# Patient Record
Sex: MALE | Race: WHITE | NOT HISPANIC OR LATINO
[De-identification: names, ages, dates, MRNs, and addresses within clinical notes are randomized per-mention and may not be internally consistent; named-entity substitution may affect disease eponyms.]

---

## 2018-11-06 PROBLEM — Z00.00 ENCOUNTER FOR PREVENTIVE HEALTH EXAMINATION: Status: ACTIVE | Noted: 2018-11-06

## 2018-11-17 ENCOUNTER — RECORD ABSTRACTING (OUTPATIENT)
Age: 83
End: 2018-11-17

## 2018-11-17 DIAGNOSIS — Z87.19 PERSONAL HISTORY OF OTHER DISEASES OF THE DIGESTIVE SYSTEM: ICD-10-CM

## 2018-11-17 DIAGNOSIS — I48.0 PAROXYSMAL ATRIAL FIBRILLATION: ICD-10-CM

## 2018-11-17 DIAGNOSIS — Z87.09 PERSONAL HISTORY OF OTHER DISEASES OF THE RESPIRATORY SYSTEM: ICD-10-CM

## 2018-11-17 DIAGNOSIS — M77.30 CALCANEAL SPUR, UNSPECIFIED FOOT: ICD-10-CM

## 2018-11-17 DIAGNOSIS — Z82.49 FAMILY HISTORY OF ISCHEMIC HEART DISEASE AND OTHER DISEASES OF THE CIRCULATORY SYSTEM: ICD-10-CM

## 2018-11-17 DIAGNOSIS — H35.30 UNSPECIFIED MACULAR DEGENERATION: ICD-10-CM

## 2018-11-17 DIAGNOSIS — Z83.3 FAMILY HISTORY OF DIABETES MELLITUS: ICD-10-CM

## 2018-11-17 DIAGNOSIS — Z83.6 FAMILY HISTORY OF OTHER DISEASES OF THE RESPIRATORY SYSTEM: ICD-10-CM

## 2018-11-17 DIAGNOSIS — Z78.9 OTHER SPECIFIED HEALTH STATUS: ICD-10-CM

## 2018-11-17 DIAGNOSIS — Z86.19 PERSONAL HISTORY OF OTHER INFECTIOUS AND PARASITIC DISEASES: ICD-10-CM

## 2018-11-17 DIAGNOSIS — Z86.69 PERSONAL HISTORY OF OTHER DISEASES OF THE NERVOUS SYSTEM AND SENSE ORGANS: ICD-10-CM

## 2018-11-17 DIAGNOSIS — Z86.39 PERSONAL HISTORY OF OTHER ENDOCRINE, NUTRITIONAL AND METABOLIC DISEASE: ICD-10-CM

## 2018-11-17 DIAGNOSIS — Z81.8 FAMILY HISTORY OF OTHER MENTAL AND BEHAVIORAL DISORDERS: ICD-10-CM

## 2018-11-17 DIAGNOSIS — S12.600A UNSPECIFIED DISPLACED FRACTURE OF SEVENTH CERVICAL VERTEBRA, INITIAL ENCOUNTER FOR CLOSED FRACTURE: ICD-10-CM

## 2018-11-17 DIAGNOSIS — I49.8 OTHER SPECIFIED CARDIAC ARRHYTHMIAS: ICD-10-CM

## 2018-11-17 DIAGNOSIS — Z87.438 PERSONAL HISTORY OF OTHER DISEASES OF MALE GENITAL ORGANS: ICD-10-CM

## 2018-11-17 DIAGNOSIS — D64.9 ANEMIA, UNSPECIFIED: ICD-10-CM

## 2018-11-17 DIAGNOSIS — Z83.1 FAMILY HISTORY OF OTHER INFECTIOUS AND PARASITIC DISEASES: ICD-10-CM

## 2018-11-17 DIAGNOSIS — F51.01 PRIMARY INSOMNIA: ICD-10-CM

## 2018-11-17 RX ORDER — CHOLECALCIFEROL (VITAMIN D3) 50 MCG
50 MCG TABLET ORAL
Refills: 0 | Status: ACTIVE | COMMUNITY

## 2018-11-17 RX ORDER — TIMOLOL MALEATE 5 MG/ML
0.5 SOLUTION OPHTHALMIC
Refills: 0 | Status: ACTIVE | COMMUNITY

## 2018-12-03 ENCOUNTER — RX RENEWAL (OUTPATIENT)
Age: 83
End: 2018-12-03

## 2018-12-05 ENCOUNTER — RX RENEWAL (OUTPATIENT)
Age: 83
End: 2018-12-05

## 2018-12-10 ENCOUNTER — APPOINTMENT (OUTPATIENT)
Dept: NEPHROLOGY | Facility: CLINIC | Age: 83
End: 2018-12-10
Payer: MEDICARE

## 2018-12-10 VITALS
DIASTOLIC BLOOD PRESSURE: 50 MMHG | SYSTOLIC BLOOD PRESSURE: 124 MMHG | HEIGHT: 67 IN | HEART RATE: 76 BPM | BODY MASS INDEX: 29.82 KG/M2 | WEIGHT: 190 LBS

## 2018-12-10 PROCEDURE — 99214 OFFICE O/P EST MOD 30 MIN: CPT

## 2018-12-10 RX ORDER — SITAGLIPTIN 100 MG/1
100 TABLET, FILM COATED ORAL DAILY
Refills: 0 | Status: DISCONTINUED | COMMUNITY
End: 2018-12-02

## 2018-12-10 RX ORDER — FERROUS SULFATE TAB EC 325 MG (65 MG FE EQUIVALENT) 325 (65 FE) MG
325 (65 FE) TABLET DELAYED RESPONSE ORAL DAILY
Refills: 0 | Status: DISCONTINUED | COMMUNITY
End: 2018-12-02

## 2018-12-13 ENCOUNTER — RX RENEWAL (OUTPATIENT)
Age: 83
End: 2018-12-13

## 2018-12-19 ENCOUNTER — RX RENEWAL (OUTPATIENT)
Age: 83
End: 2018-12-19

## 2018-12-19 RX ORDER — FERROUS SULFATE 325(65) MG
325 (65 FE) TABLET ORAL
Refills: 0 | Status: ACTIVE | COMMUNITY

## 2019-01-10 ENCOUNTER — RX RENEWAL (OUTPATIENT)
Age: 84
End: 2019-01-10

## 2019-01-14 ENCOUNTER — RX RENEWAL (OUTPATIENT)
Age: 84
End: 2019-01-14

## 2019-01-28 ENCOUNTER — OTHER (OUTPATIENT)
Age: 84
End: 2019-01-28

## 2019-02-04 ENCOUNTER — RESULT REVIEW (OUTPATIENT)
Age: 84
End: 2019-02-04

## 2019-02-11 ENCOUNTER — APPOINTMENT (OUTPATIENT)
Dept: GERIATRICS | Facility: CLINIC | Age: 84
End: 2019-02-11
Payer: MEDICARE

## 2019-02-11 VITALS
HEART RATE: 75 BPM | DIASTOLIC BLOOD PRESSURE: 60 MMHG | WEIGHT: 190 LBS | BODY MASS INDEX: 29.76 KG/M2 | SYSTOLIC BLOOD PRESSURE: 134 MMHG | TEMPERATURE: 97.8 F | OXYGEN SATURATION: 97 %

## 2019-02-11 DIAGNOSIS — Z71.89 OTHER SPECIFIED COUNSELING: ICD-10-CM

## 2019-02-11 PROCEDURE — 99497 ADVNCD CARE PLAN 30 MIN: CPT

## 2019-02-11 NOTE — REVIEW OF SYSTEMS
[Fever] : no fever [Chills] : no chills [Red Eyes] : eyes not red [Discharge From Eyes] : no purulent discharge from the eyes [Nosebleeds] : no nosebleeds [Nasal Discharge] : no nasal discharge [Chest Pain] : no chest pain [Palpitations] : no palpitations [Shortness Of Breath] : no shortness of breath [Wheezing] : no wheezing [Cough] : no cough [Abdominal Pain] : no abdominal pain [Vomiting] : no vomiting [Genital Lesion] : no genital lesions [Testicular Pain] : no testicular pain [Limb Pain] : no limb pain [Limb Swelling] : no limb swelling [Skin Lesions] : no skin lesions [Skin Wound] : no skin wound [Dizziness] : no dizziness [Fainting] : no fainting [Anxiety] : no anxiety [Depression] : no depression

## 2019-02-11 NOTE — ASSESSMENT
[FreeTextEntry1] : filled out MOLST form today\par to be scanned into chart\par patient is full code, but if irreversible changes - poor quality of life would not want to have life prolonging measures\par to bring in copy of HCP and living will as well\par reviewed labs today\par A1C slightly uptrending\par Cr controlled and lipids stable\par RTC july

## 2019-02-11 NOTE — HISTORY OF PRESENT ILLNESS
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

## 2019-02-11 NOTE — SOCIAL HISTORY
[Any fall with injury in past year] : Patient reported fall with injury in the past year [Fully functional (bathing, dressing, toileting, transferring, walking, feeding)] : Fully functional (bathing, dressing, toileting, transferring, walking, feeding) [Fully functional (using the telephone, shopping, preparing meals, housekeeping, doing laundry, using transportation,] : Fully functional and needs no help or supervision to perform IADLs (using the telephone, shopping, preparing meals, housekeeping, doing laundry, using transportation, managing medications and managing finances) [Canes] : patricia [Smoke Detector] : smoke detector [Carbon Monoxide Detector] : carbon monoxide detector [Safety elements used in home] : safety elements used in home [Grab Bars] : grab bars [Shower Chair] : shower chair [Night Light] : night light [Anti-Slip Measures] : anti-slip measures [Seat Belt] :  uses seat belt [Driving] : driving

## 2019-02-11 NOTE — PHYSICAL EXAM
[General Appearance - Alert] : alert [General Appearance - In No Acute Distress] : in no acute distress [General Appearance - Well Nourished] : well nourished [General Appearance - Well Developed] : well developed [Sclera] : the sclera and conjunctiva were normal [Extraocular Movements] : extraocular movements were intact [Normal Oral Mucosa] : normal oral mucosa [No Oral Pallor] : no oral pallor [Neck Appearance] : the appearance of the neck was normal [Respiration, Rhythm And Depth] : normal respiratory rhythm and effort [Exaggerated Use Of Accessory Muscles For Inspiration] : no accessory muscle use [Bowel Sounds] : normal bowel sounds [Abdomen Soft] : soft [Abdomen Tenderness] : non-tender [No CVA Tenderness] : no ~M costovertebral angle tenderness [Abnormal Walk] : normal gait [Skin Color & Pigmentation] : normal skin color and pigmentation [] : no rash [Oriented To Time, Place, And Person] : oriented to person, place, and time [Impaired Insight] : insight and judgment were intact [Affect] : the affect was normal [Mood] : the mood was normal

## 2019-03-03 ENCOUNTER — RESULT CHARGE (OUTPATIENT)
Age: 84
End: 2019-03-03

## 2019-03-04 ENCOUNTER — APPOINTMENT (OUTPATIENT)
Dept: ENDOCRINOLOGY | Facility: CLINIC | Age: 84
End: 2019-03-04
Payer: MEDICARE

## 2019-03-04 VITALS
BODY MASS INDEX: 30.29 KG/M2 | WEIGHT: 193 LBS | HEIGHT: 67 IN | SYSTOLIC BLOOD PRESSURE: 155 MMHG | DIASTOLIC BLOOD PRESSURE: 60 MMHG

## 2019-03-04 PROCEDURE — 82962 GLUCOSE BLOOD TEST: CPT

## 2019-03-04 PROCEDURE — 99214 OFFICE O/P EST MOD 30 MIN: CPT

## 2019-03-04 NOTE — HISTORY OF PRESENT ILLNESS
[FreeTextEntry1] :   85 yo WM coming for f/u DM2, last seen me more than 4yrs ago, pt of Dr Gee\par        Follow up for TDM2 with micro and macrovascular complications including CABG 2013, HTN, HLD, Nephropathy and neuropathy. \par        Dx 10 years ago. \par        Hga1c up to 7.3, now firher up to 7.6 despite increasing Truclitiy last visit \par        Regimen: \par        Metformin ER dec to 500mg PO Qam and 500mg QPM\par        off Januvia 100mg PO at Lunch stopped 8/2018\par        Glipizide ER 10mg ER PO BID\par        Trulicity 1.5mg SC weekly denies side effects \par        Tolerating medications well with no SE. \par        Fell at home Fell on to coffee table. Fractured cervical spine. 3 months ago, lost weight then gained back\par        Checks BG levels twice daily from BS log :\par        Sporadically throughout the day\par        No lower than 70\par        has 160-230 after meals \par        Microvascular complications: \par        Nephropathy denies Cr 1.4 eGFR 42. Sees Dr. Cochran. decreased Clorthalidone twice a week\par        Neuropathy symptoms, Due for another appointment. \par        microfilament exam + neuropathy.\par        Retinopathy sees Dr. Fierro. Had Elyea Left eye Injection. Visit Q6-8 weeks. \par        No recent loss of vision or blurry vision. \par        Macrovascular complications: \par        HTN improved. Sees Dr. Chopra. Toprolol ER 25mg PO BID, Clorthalidone 25mg PO Twice weekly. Echo and stress next week. Diovan dcd and now on Losartan 100mg PO Qdaily\par        CAD/CVA yes CABG 2013/Afib. Off Elaquis since the fall on 81mg PO Qdaily\par        Lipids , LDL 58, HDL 28. Lovaza 2g bid, Lipitor 40mg qpm\par        Vitamin B12 liquid once weekly \par        Vitamin D3 1000iu tab Twice daily\par        Iron 325mg PO Qdaily\par        TSH 1.97.

## 2019-04-10 ENCOUNTER — APPOINTMENT (OUTPATIENT)
Dept: NEPHROLOGY | Facility: CLINIC | Age: 84
End: 2019-04-10
Payer: MEDICARE

## 2019-04-10 VITALS
SYSTOLIC BLOOD PRESSURE: 150 MMHG | HEART RATE: 64 BPM | WEIGHT: 185 LBS | BODY MASS INDEX: 29.03 KG/M2 | HEIGHT: 67 IN | DIASTOLIC BLOOD PRESSURE: 62 MMHG

## 2019-04-10 PROCEDURE — 99214 OFFICE O/P EST MOD 30 MIN: CPT

## 2019-04-10 NOTE — ASSESSMENT
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

## 2019-04-10 NOTE — PHYSICAL EXAM
[General Appearance - Alert] : alert [Outer Ear] : the ears and nose were normal in appearance [Sclera] : the sclera and conjunctiva were normal [Neck Cervical Mass (___cm)] : no neck mass was observed [Neck Appearance] : the appearance of the neck was normal [] : no respiratory distress [Apical Impulse] : the apical impulse was normal [Exaggerated Use Of Accessory Muscles For Inspiration] : no accessory muscle use [Heart Sounds] : normal S1 and S2 [Bowel Sounds] : normal bowel sounds [Abdomen Tenderness] : non-tender [Nail Clubbing] : no clubbing  or cyanosis of the fingernails [Abnormal Walk] : normal gait [Oriented To Time, Place, And Person] : oriented to person, place, and time [Cranial Nerves] : cranial nerves 2-12 were intact [Sensation] : the sensory exam was normal to light touch and pinprick [Impaired Insight] : insight and judgment were intact

## 2019-04-30 ENCOUNTER — APPOINTMENT (OUTPATIENT)
Dept: CARDIOLOGY | Facility: CLINIC | Age: 84
End: 2019-04-30
Payer: MEDICARE

## 2019-04-30 ENCOUNTER — NON-APPOINTMENT (OUTPATIENT)
Age: 84
End: 2019-04-30

## 2019-04-30 VITALS
HEIGHT: 67 IN | BODY MASS INDEX: 29.35 KG/M2 | SYSTOLIC BLOOD PRESSURE: 140 MMHG | DIASTOLIC BLOOD PRESSURE: 60 MMHG | WEIGHT: 187 LBS | HEART RATE: 72 BPM

## 2019-04-30 VITALS
WEIGHT: 187 LBS | DIASTOLIC BLOOD PRESSURE: 60 MMHG | BODY MASS INDEX: 29.35 KG/M2 | HEIGHT: 67 IN | HEART RATE: 72 BPM | SYSTOLIC BLOOD PRESSURE: 140 MMHG

## 2019-04-30 PROCEDURE — 93000 ELECTROCARDIOGRAM COMPLETE: CPT

## 2019-04-30 PROCEDURE — 99213 OFFICE O/P EST LOW 20 MIN: CPT

## 2019-04-30 NOTE — DISCUSSION/SUMMARY
[FreeTextEntry1] : The patient is doing very well clinically. He has rare episodes of angina which are currently under unusual circumstances in general he remains very active with no cardiac symptoms. His examination is satisfactory his electrocardiogram reveals sinus rhythm left axis deviation right bundle branch block with no acute changes noted. Based on today's visit I plan to continue the patient's current medications we will do treadmill testing in several months.

## 2019-04-30 NOTE — REASON FOR VISIT
[FreeTextEntry1] : He patient is followed with a principal diagnosis of coronary bypass surgery and diabetes mellitus.

## 2019-04-30 NOTE — PHYSICAL EXAM
[General Appearance - Well Developed] : well developed [Normal Appearance] : normal appearance [Well Groomed] : well groomed [General Appearance - Well Nourished] : well nourished [No Deformities] : no deformities [General Appearance - In No Acute Distress] : no acute distress [Normal Conjunctiva] : the conjunctiva exhibited no abnormalities [Eyelids - No Xanthelasma] : the eyelids demonstrated no xanthelasmas [Normal Oral Mucosa] : normal oral mucosa [No Oral Pallor] : no oral pallor [No Oral Cyanosis] : no oral cyanosis [Normal Jugular Venous A Waves Present] : normal jugular venous A waves present [Normal Jugular Venous V Waves Present] : normal jugular venous V waves present [No Jugular Venous Ash A Waves] : no jugular venous ash A waves [Respiration, Rhythm And Depth] : normal respiratory rhythm and effort [Exaggerated Use Of Accessory Muscles For Inspiration] : no accessory muscle use [Auscultation Breath Sounds / Voice Sounds] : lungs were clear to auscultation bilaterally [Heart Rate And Rhythm] : heart rate and rhythm were normal [Heart Sounds] : normal S1 and S2 [Murmurs] : no murmurs present [FreeTextEntry1] : median sternotomy scar. [Abdomen Soft] : soft [Abdomen Tenderness] : non-tender [Abdomen Mass (___ Cm)] : no abdominal mass palpated [Abnormal Walk] : normal gait [Gait - Sufficient For Exercise Testing] : the gait was sufficient for exercise testing [Nail Clubbing] : no clubbing of the fingernails [Cyanosis, Localized] : no localized cyanosis [Petechial Hemorrhages (___cm)] : no petechial hemorrhages [Skin Color & Pigmentation] : normal skin color and pigmentation [] : no rash [No Venous Stasis] : no venous stasis [Skin Lesions] : no skin lesions [No Skin Ulcers] : no skin ulcer [No Xanthoma] : no  xanthoma was observed [Oriented To Time, Place, And Person] : oriented to person, place, and time [Affect] : the affect was normal [Mood] : the mood was normal [No Anxiety] : not feeling anxious

## 2019-04-30 NOTE — HISTORY OF PRESENT ILLNESS
[FreeTextEntry1] : The patient is doing very well clinically. He remains quite active with regular exercises. He does note occasional episodes of angina especially when in [place]. This occurs when he is rushing and a very crowded situation. Symptoms resolved very promptly without any intervention. He has not required nitroglycerin. Symptom has not in creased in frequency or severity to

## 2019-05-21 ENCOUNTER — APPOINTMENT (OUTPATIENT)
Dept: GERIATRICS | Facility: CLINIC | Age: 84
End: 2019-05-21
Payer: MEDICARE

## 2019-05-21 VITALS
SYSTOLIC BLOOD PRESSURE: 140 MMHG | HEART RATE: 62 BPM | OXYGEN SATURATION: 97 % | RESPIRATION RATE: 18 BRPM | DIASTOLIC BLOOD PRESSURE: 62 MMHG | TEMPERATURE: 97.7 F

## 2019-05-21 DIAGNOSIS — J06.9 ACUTE UPPER RESPIRATORY INFECTION, UNSPECIFIED: ICD-10-CM

## 2019-05-21 PROCEDURE — 99213 OFFICE O/P EST LOW 20 MIN: CPT

## 2019-05-21 NOTE — HISTORY OF PRESENT ILLNESS
[FreeTextEntry1] : cough and cold for about one week, no phlegm production\par did have laryngitis as well which is now resolving\par Took claritin with no effect and has been \par taking ricola cough drops\par This morning felt a "tightness" in his lungs\par No chest pain, no palpitations no SOB\par No chills no fever no body aching\par Appetite has been nl

## 2019-05-21 NOTE — REVIEW OF SYSTEMS
[Fever] : no fever [Chills] : no chills [Feeling Tired] : not feeling tired [Shortness Of Breath] : no shortness of breath [Wheezing] : no wheezing [Cough] : cough [SOB on Exertion] : no shortness of breath during exertion [Negative] : Neurological

## 2019-05-21 NOTE — ASSESSMENT
[FreeTextEntry1] : continue supportive care including rest, increased fluids, warm teas and honey. May take tylenol\par Inform NP or MD if any increased cough, chest pain, fever, SOB or other concerns or does not\par get better within 3-5 days. He verbalized understanding

## 2019-05-21 NOTE — PHYSICAL EXAM
[General Appearance - Alert] : alert [General Appearance - In No Acute Distress] : in no acute distress [General Appearance - Well Nourished] : well nourished [General Appearance - Well Developed] : well developed [Both Tympanic Membranes Were Examined] : both tympanic membranes were normal [Nasal Cavity] : the nasal mucosa and septum were normal [Oropharynx] : the oropharynx was normal [FreeTextEntry1] : no tenderness over facial sinuses [Neck Appearance] : the appearance of the neck was normal [] : no respiratory distress [Respiration, Rhythm And Depth] : normal respiratory rhythm and effort [Exaggerated Use Of Accessory Muscles For Inspiration] : no accessory muscle use [Auscultation Breath Sounds / Voice Sounds] : lungs were clear to auscultation bilaterally [Apical Impulse] : the apical impulse was normal [Heart Rate And Rhythm] : heart rate was normal and rhythm regular [Heart Sounds] : normal S1 and S2

## 2019-05-28 ENCOUNTER — RX RENEWAL (OUTPATIENT)
Age: 84
End: 2019-05-28

## 2019-06-10 ENCOUNTER — APPOINTMENT (OUTPATIENT)
Dept: ENDOCRINOLOGY | Facility: CLINIC | Age: 84
End: 2019-06-10
Payer: MEDICARE

## 2019-06-10 ENCOUNTER — APPOINTMENT (OUTPATIENT)
Dept: ENDOCRINOLOGY | Facility: CLINIC | Age: 84
End: 2019-06-10

## 2019-06-10 ENCOUNTER — APPOINTMENT (OUTPATIENT)
Dept: GERIATRICS | Facility: CLINIC | Age: 84
End: 2019-06-10
Payer: MEDICARE

## 2019-06-10 VITALS — HEART RATE: 76 BPM | OXYGEN SATURATION: 97 % | SYSTOLIC BLOOD PRESSURE: 128 MMHG | DIASTOLIC BLOOD PRESSURE: 54 MMHG

## 2019-06-10 VITALS
DIASTOLIC BLOOD PRESSURE: 62 MMHG | HEIGHT: 67 IN | BODY MASS INDEX: 29.19 KG/M2 | HEART RATE: 65 BPM | WEIGHT: 186 LBS | SYSTOLIC BLOOD PRESSURE: 110 MMHG

## 2019-06-10 VITALS
HEIGHT: 67 IN | SYSTOLIC BLOOD PRESSURE: 124 MMHG | BODY MASS INDEX: 29.51 KG/M2 | HEART RATE: 62 BPM | DIASTOLIC BLOOD PRESSURE: 72 MMHG | WEIGHT: 188 LBS

## 2019-06-10 LAB
GLUCOSE BLDC GLUCOMTR-MCNC: 170
GLUCOSE BLDC GLUCOMTR-MCNC: 197

## 2019-06-10 PROCEDURE — 99214 OFFICE O/P EST MOD 30 MIN: CPT

## 2019-06-10 PROCEDURE — 99213 OFFICE O/P EST LOW 20 MIN: CPT

## 2019-06-10 NOTE — HISTORY OF PRESENT ILLNESS
[FreeTextEntry1] : Was hospitalized for dizzness and started on \par meclizine prn\par Did have very elevated BP in hospital as per\par pt ~220/120. \par Now here for f/u BP

## 2019-06-10 NOTE — ASSESSMENT
[FreeTextEntry1] : Pt will f/u with [facility] nurse mgr to take BP biw x next 2 wks and to inform MD or NP if any dizziness not controlled with prn meclizine, not feeling well, headaches or other concerns. He verbalized understanding

## 2019-06-10 NOTE — HISTORY OF PRESENT ILLNESS
[FreeTextEntry1] :   87 yo WM coming for f/u DM2, seen Medina Willson \par        Follow up for TDM2 with micro and macrovascular complications including CABG 2013, HTN, HLD, Nephropathy and neuropathy. \par        Dx 10 years ago. \par        Hga1c up to 7.3, now firher up to 7.6 despite increasing Truclitiy last visit \par        Regimen: \par        Metformin ER dec to 500mg PO Qam and 500mg QPM\par        off Januvia 100mg PO at Lunch stopped 8/2018\par        Glipizide ER 10mg ER PO BID\par        Trulicity 1.5mg SC weekly denies side effects \par        Tolerating medications well with no SE. \par        Fell at home Fell on to coffee table. Fractured cervical spine. 3 months ago, lost weight then gained back\par        Checks BG levels twice daily from BS log :\par        Sporadically throughout the day\par      fasting 101-174\par        has    after meals  once 224\par        Microvascular complications: \par        Nephropathy denies Cr 1.4 eGFR 42. Sees Dr. Cochran. decreased Clorthalidone twice a week\par        Neuropathy symptoms, Due for another appointment. \par        microfilament exam + neuropathy.\par        Retinopathy sees Dr. Fierro. Had Elyea Left eye Injection. Visit Q6-8 weeks. \par        No recent loss of vision or blurry vision. \par        Macrovascular complications: \par        HTN improved. Sees Dr. Chopra. Toprolol ER 25mg PO BID, Clorthalidone 25mg PO Twice weekly. Echo and stress next week. Diovan dcd and now on Losartan 100mg PO Qdaily\par        CAD/CVA yes CABG 2013/Afib. Off Elaquis since the fall on 81mg PO Qdaily\par        Lipids , LDL 58, HDL 28. Lovaza 2g bid, Lipitor 40mg qpm\par        Vitamin B12 liquid once weekly \par        Vitamin D3 1000iu tab Twice daily\par        Iron 325mg PO Qdaily\par        TSH 1.97.

## 2019-06-10 NOTE — PHYSICAL EXAM
[Alert] : alert [Well Nourished] : well nourished [No Acute Distress] : no acute distress [Well Developed] : well developed [Normal Sclera/Conjunctiva] : normal sclera/conjunctiva [EOMI] : extra ocular movement intact [Thyroid Not Enlarged] : the thyroid was not enlarged [Normal Oropharynx] : the oropharynx was normal [No Proptosis] : no proptosis [No Respiratory Distress] : no respiratory distress [No Thyroid Nodules] : there were no palpable thyroid nodules [Clear to Auscultation] : lungs were clear to auscultation bilaterally [No Accessory Muscle Use] : no accessory muscle use [Normal S1, S2] : normal S1 and S2 [Normal Rate] : heart rate was normal  [Regular Rhythm] : with a regular rhythm [Pedal Pulses Normal] : the pedal pulses are present [Not Tender] : non-tender [Normal Bowel Sounds] : normal bowel sounds [No Edema] : there was no peripheral edema [Soft] : abdomen soft [Anterior Cervical Nodes] : anterior cervical nodes [Post Cervical Nodes] : posterior cervical nodes [Not Distended] : not distended [Axillary Nodes] : axillary nodes [Normal] : normal and non tender [Spine Straight] : spine straight [No Spinal Tenderness] : no spinal tenderness [Normal Gait] : normal gait [No Stigmata of Cushings Syndrome] : no stigmata of cushings syndrome [No Rash] : no rash [Normal Strength/Tone] : muscle strength and tone were normal [Acanthosis Nigricans] : no acanthosis nigricans [Normal Reflexes] : deep tendon reflexes were 2+ and symmetric [No Tremors] : no tremors [Oriented x3] : oriented to person, place, and time

## 2019-06-10 NOTE — PHYSICAL EXAM
[General Appearance - Alert] : alert [General Appearance - In No Acute Distress] : in no acute distress [General Appearance - Well Developed] : well developed [General Appearance - Well Nourished] : well nourished [] : no respiratory distress [Sclera] : the sclera and conjunctiva were normal [Apical Impulse] : the apical impulse was normal [Respiration, Rhythm And Depth] : normal respiratory rhythm and effort [Auscultation Breath Sounds / Voice Sounds] : lungs were clear to auscultation bilaterally [Heart Sounds] : normal S1 and S2 [Heart Rate And Rhythm] : heart rate was normal and rhythm regular

## 2019-07-01 ENCOUNTER — RX RENEWAL (OUTPATIENT)
Age: 84
End: 2019-07-01

## 2019-07-05 ENCOUNTER — MEDICATION RENEWAL (OUTPATIENT)
Age: 84
End: 2019-07-05

## 2019-07-09 ENCOUNTER — APPOINTMENT (OUTPATIENT)
Dept: CARDIOLOGY | Facility: CLINIC | Age: 84
End: 2019-07-09
Payer: MEDICARE

## 2019-07-09 ENCOUNTER — NON-APPOINTMENT (OUTPATIENT)
Age: 84
End: 2019-07-09

## 2019-07-09 VITALS
HEART RATE: 67 BPM | SYSTOLIC BLOOD PRESSURE: 138 MMHG | DIASTOLIC BLOOD PRESSURE: 68 MMHG | HEIGHT: 67 IN | BODY MASS INDEX: 28.88 KG/M2 | WEIGHT: 184 LBS

## 2019-07-09 PROCEDURE — 99214 OFFICE O/P EST MOD 30 MIN: CPT

## 2019-07-09 PROCEDURE — 93000 ELECTROCARDIOGRAM COMPLETE: CPT

## 2019-07-09 RX ORDER — ATORVASTATIN CALCIUM 40 MG/1
40 TABLET, FILM COATED ORAL DAILY
Refills: 0 | Status: DISCONTINUED | COMMUNITY
End: 2019-07-09

## 2019-07-09 NOTE — PHYSICAL EXAM
[General Appearance - Well Developed] : well developed [Normal Appearance] : normal appearance [Well Groomed] : well groomed [No Deformities] : no deformities [General Appearance - Well Nourished] : well nourished [General Appearance - In No Acute Distress] : no acute distress [Normal Conjunctiva] : the conjunctiva exhibited no abnormalities [Eyelids - No Xanthelasma] : the eyelids demonstrated no xanthelasmas [Normal Oral Mucosa] : normal oral mucosa [No Oral Pallor] : no oral pallor [Normal Jugular Venous A Waves Present] : normal jugular venous A waves present [No Oral Cyanosis] : no oral cyanosis [Normal Jugular Venous V Waves Present] : normal jugular venous V waves present [No Jugular Venous Ash A Waves] : no jugular venous ash A waves [Respiration, Rhythm And Depth] : normal respiratory rhythm and effort [Exaggerated Use Of Accessory Muscles For Inspiration] : no accessory muscle use [Auscultation Breath Sounds / Voice Sounds] : lungs were clear to auscultation bilaterally [Heart Rate And Rhythm] : heart rate and rhythm were normal [Heart Sounds] : normal S1 and S2 [Murmurs] : no murmurs present [Abdomen Tenderness] : non-tender [Abdomen Soft] : soft [Abdomen Mass (___ Cm)] : no abdominal mass palpated [Nail Clubbing] : no clubbing of the fingernails [Abnormal Walk] : normal gait [Gait - Sufficient For Exercise Testing] : the gait was sufficient for exercise testing [Petechial Hemorrhages (___cm)] : no petechial hemorrhages [Cyanosis, Localized] : no localized cyanosis [Skin Color & Pigmentation] : normal skin color and pigmentation [] : no rash [No Venous Stasis] : no venous stasis [Skin Lesions] : no skin lesions [No Xanthoma] : no  xanthoma was observed [No Skin Ulcers] : no skin ulcer [Oriented To Time, Place, And Person] : oriented to person, place, and time [Affect] : the affect was normal [Mood] : the mood was normal [No Anxiety] : not feeling anxious

## 2019-07-09 NOTE — REASON FOR VISIT
[FreeTextEntry1] : The patient was recently admitted to [facility] on June 4 with symptoms of vertigo. The patient was found to have an elevated blood pressure in the ER exceeding 200 mm of mercury systolic. There was concern about a neurologic process and he was admitted to the hospital for three  nights. Room this time both the vertigo and hypertension came under very good control. There was no evidence of an acute neurologic event. I suspect that the spike and blood pressure was related to the vertigo itself causing a great deal of anxiety. Following discharge the patient has done well. He has returned to his normal activities and symptoms have completely resolved.

## 2019-07-09 NOTE — DISCUSSION/SUMMARY
[FreeTextEntry1] : The patient's clinical condition is now stable and cleared back to normal". 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 Based on my evaluation am pleased with the patient's current condition. Present medications will be continued.

## 2019-07-18 ENCOUNTER — OTHER (OUTPATIENT)
Age: 84
End: 2019-07-18

## 2019-07-29 ENCOUNTER — APPOINTMENT (OUTPATIENT)
Dept: GERIATRICS | Facility: CLINIC | Age: 84
End: 2019-07-29
Payer: MEDICARE

## 2019-07-29 VITALS
OXYGEN SATURATION: 99 % | DIASTOLIC BLOOD PRESSURE: 67 MMHG | WEIGHT: 185 LBS | HEART RATE: 71 BPM | SYSTOLIC BLOOD PRESSURE: 150 MMHG | BODY MASS INDEX: 28.98 KG/M2 | TEMPERATURE: 98.5 F

## 2019-07-29 VITALS — DIASTOLIC BLOOD PRESSURE: 70 MMHG | SYSTOLIC BLOOD PRESSURE: 128 MMHG

## 2019-07-29 PROCEDURE — 99214 OFFICE O/P EST MOD 30 MIN: CPT

## 2019-07-29 RX ORDER — GUAIFENESIN 600 MG/1
600 TABLET, EXTENDED RELEASE ORAL
Qty: 10 | Refills: 0 | Status: DISCONTINUED | OUTPATIENT
Start: 2019-05-21 | End: 2019-07-29

## 2019-07-29 NOTE — ASSESSMENT
[FreeTextEntry1] : s/p first episode of vertigo\par now has PRN meclizine at home\par gave Rx for hearing evaluation (annual)\par A1C due in 2 months\par refilled lipitor\par otherwise stable\par repeat BP better controlled

## 2019-07-29 NOTE — REVIEW OF SYSTEMS
[Fever] : no fever [Chills] : no chills [Red Eyes] : eyes not red [Discharge From Eyes] : no purulent discharge from the eyes [Nosebleeds] : no nosebleeds [Nasal Discharge] : no nasal discharge [Chest Pain] : no chest pain [Palpitations] : no palpitations [Wheezing] : no wheezing [Shortness Of Breath] : no shortness of breath [Cough] : no cough [Abdominal Pain] : no abdominal pain [Vomiting] : no vomiting [Genital Lesion] : no genital lesions [Testicular Pain] : no testicular pain [Limb Pain] : no limb pain [Limb Swelling] : no limb swelling [Skin Lesions] : no skin lesions [Skin Wound] : no skin wound [Dizziness] : no dizziness [Fainting] : no fainting [Anxiety] : no anxiety [Depression] : no depression

## 2019-07-29 NOTE — PHYSICAL EXAM
[General Appearance - Alert] : alert [General Appearance - In No Acute Distress] : in no acute distress [General Appearance - Well Nourished] : well nourished [General Appearance - Well Developed] : well developed [Sclera] : the sclera and conjunctiva were normal [Extraocular Movements] : extraocular movements were intact [No Oral Pallor] : no oral pallor [Normal Oral Mucosa] : normal oral mucosa [Neck Appearance] : the appearance of the neck was normal [Respiration, Rhythm And Depth] : normal respiratory rhythm and effort [Bowel Sounds] : normal bowel sounds [Exaggerated Use Of Accessory Muscles For Inspiration] : no accessory muscle use [Abdomen Soft] : soft [Abdomen Tenderness] : non-tender [Abnormal Walk] : normal gait [No CVA Tenderness] : no ~M costovertebral angle tenderness [Skin Color & Pigmentation] : normal skin color and pigmentation [Impaired Insight] : insight and judgment were intact [] : no rash [Oriented To Time, Place, And Person] : oriented to person, place, and time [Affect] : the affect was normal [Mood] : the mood was normal

## 2019-07-29 NOTE — SOCIAL HISTORY
[No falls in past year] : Patient reported no falls in the past year [Fully functional (bathing, dressing, toileting, transferring, walking, feeding)] : Fully functional (bathing, dressing, toileting, transferring, walking, feeding) [Fully functional (using the telephone, shopping, preparing meals, housekeeping, doing laundry, using transportation,] : Fully functional and needs no help or supervision to perform IADLs (using the telephone, shopping, preparing meals, housekeeping, doing laundry, using transportation, managing medications and managing finances) [Canes] : patricia [Smoke Detector] : smoke detector [Carbon Monoxide Detector] : carbon monoxide detector [Safety elements used in home] : safety elements used in home [Grab Bars] : grab bars [Shower Chair] : shower chair [Night Light] : night light [Anti-Slip Measures] : anti-slip measures [Seat Belt] :  uses seat belt [Driving] : driving [de-identified] : AS NEEDED

## 2019-07-29 NOTE — HISTORY OF PRESENT ILLNESS
[0] : 1) Little interest or pleasure doing things: Not at all [PHQ-2 Score ___] : PHQ-2 Score [unfilled] [FreeTextEntry1] : Admitted to [facility] June 4 with symptoms of vertigo and elevated SBP >200.  \par \par At this time both the vertigo and hypertension have resolved. \par This was his first episode of vertigo in his lifetime.  He believes he might have had URI in May.  Onset had been gradual as the morning of hospital admission he was driving.  Symptoms had rapid onset.  \par \par Otherwise feeling well.  Metformin and Glipizide were recently increased as well as trulicity.  His AM sugars fasting are typically below 100.  He is trying to adhere to a more strict diet.

## 2019-08-06 ENCOUNTER — RX RENEWAL (OUTPATIENT)
Age: 84
End: 2019-08-06

## 2019-08-09 ENCOUNTER — MEDICATION RENEWAL (OUTPATIENT)
Age: 84
End: 2019-08-09

## 2019-08-12 ENCOUNTER — MEDICATION RENEWAL (OUTPATIENT)
Age: 84
End: 2019-08-12

## 2019-08-23 ENCOUNTER — RX RENEWAL (OUTPATIENT)
Age: 84
End: 2019-08-23

## 2019-09-10 ENCOUNTER — APPOINTMENT (OUTPATIENT)
Dept: ENDOCRINOLOGY | Facility: CLINIC | Age: 84
End: 2019-09-10
Payer: MEDICARE

## 2019-09-10 VITALS
WEIGHT: 187 LBS | BODY MASS INDEX: 29.35 KG/M2 | RESPIRATION RATE: 16 BRPM | HEART RATE: 70 BPM | DIASTOLIC BLOOD PRESSURE: 70 MMHG | HEIGHT: 67 IN | SYSTOLIC BLOOD PRESSURE: 130 MMHG | OXYGEN SATURATION: 99 %

## 2019-09-10 LAB — GLUCOSE BLDC GLUCOMTR-MCNC: 77

## 2019-09-10 PROCEDURE — 82962 GLUCOSE BLOOD TEST: CPT

## 2019-09-10 PROCEDURE — G0447 BEHAVIOR COUNSEL OBESITY 15M: CPT | Mod: 59

## 2019-09-10 PROCEDURE — 99214 OFFICE O/P EST MOD 30 MIN: CPT | Mod: 25

## 2019-09-10 NOTE — PHYSICAL EXAM
[Alert] : alert [No Acute Distress] : no acute distress [Well Nourished] : well nourished [Well Developed] : well developed [Normal Sclera/Conjunctiva] : normal sclera/conjunctiva [EOMI] : extra ocular movement intact [No Proptosis] : no proptosis [Normal Oropharynx] : the oropharynx was normal [Thyroid Not Enlarged] : the thyroid was not enlarged [No Thyroid Nodules] : there were no palpable thyroid nodules [No Respiratory Distress] : no respiratory distress [No Accessory Muscle Use] : no accessory muscle use [Clear to Auscultation] : lungs were clear to auscultation bilaterally [Normal Rate] : heart rate was normal  [Normal S1, S2] : normal S1 and S2 [Regular Rhythm] : with a regular rhythm [Pedal Pulses Normal] : the pedal pulses are present [No Edema] : there was no peripheral edema [Normal Bowel Sounds] : normal bowel sounds [Not Tender] : non-tender [Soft] : abdomen soft [Not Distended] : not distended [Post Cervical Nodes] : posterior cervical nodes [Anterior Cervical Nodes] : anterior cervical nodes [Axillary Nodes] : axillary nodes [No Spinal Tenderness] : no spinal tenderness [Spine Straight] : spine straight [No Stigmata of Cushings Syndrome] : no stigmata of cushings syndrome [Normal Gait] : normal gait [Normal Strength/Tone] : muscle strength and tone were normal [No Rash] : no rash [Acanthosis Nigricans] : no acanthosis nigricans [Right Foot Was Examined] : right foot ~C was examined [Left Foot Was Examined] : left foot ~C was examined [Normal] : normal [Full ROM] : with full range of motion [2+] : 2+ in the dorsalis pedis [Vibration Dec.] : normal vibratory sensation at the level of the toes [Position Sense Dec.] : normal position sense at the level of the toes [Diminished Throughout Both Feet] : normal tactile sensation with monofilament testing throughout both feet [Normal Reflexes] : deep tendon reflexes were 2+ and symmetric [No Tremors] : no tremors [Normal Sensation on Monofilament Testing] : normal sensation on monofilament testing of lower extremities [Oriented x3] : oriented to person, place, and time

## 2019-10-08 ENCOUNTER — RX RENEWAL (OUTPATIENT)
Age: 84
End: 2019-10-08

## 2019-10-11 ENCOUNTER — APPOINTMENT (OUTPATIENT)
Dept: GERIATRICS | Facility: CLINIC | Age: 84
End: 2019-10-11
Payer: MEDICARE

## 2019-10-11 PROCEDURE — 90686 IIV4 VACC NO PRSV 0.5 ML IM: CPT

## 2019-10-11 PROCEDURE — G0008: CPT

## 2019-10-14 ENCOUNTER — APPOINTMENT (OUTPATIENT)
Dept: NEPHROLOGY | Facility: CLINIC | Age: 84
End: 2019-10-14

## 2019-10-29 ENCOUNTER — MEDICATION RENEWAL (OUTPATIENT)
Age: 84
End: 2019-10-29

## 2019-10-29 ENCOUNTER — RX RENEWAL (OUTPATIENT)
Age: 84
End: 2019-10-29

## 2019-11-05 ENCOUNTER — APPOINTMENT (OUTPATIENT)
Dept: CARDIOLOGY | Facility: CLINIC | Age: 84
End: 2019-11-05
Payer: MEDICARE

## 2019-11-05 VITALS
HEART RATE: 69 BPM | BODY MASS INDEX: 28.72 KG/M2 | SYSTOLIC BLOOD PRESSURE: 130 MMHG | HEIGHT: 67 IN | DIASTOLIC BLOOD PRESSURE: 78 MMHG | WEIGHT: 183 LBS

## 2019-11-05 PROCEDURE — 93351 STRESS TTE COMPLETE: CPT

## 2019-11-05 PROCEDURE — 99212 OFFICE O/P EST SF 10 MIN: CPT

## 2019-11-05 NOTE — REASON FOR VISIT
[FreeTextEntry1] : The patient is doing well clinically. There have been no known recurrences of atrial fibrillation or spikes in blood pressure. The patient remains fairly active. He does note some anginal discomfort when walking in [facility].

## 2019-11-05 NOTE — PHYSICAL EXAM
[General Appearance - Well Developed] : well developed [Normal Appearance] : normal appearance [Well Groomed] : well groomed [General Appearance - Well Nourished] : well nourished [No Deformities] : no deformities [General Appearance - In No Acute Distress] : no acute distress [Normal Conjunctiva] : the conjunctiva exhibited no abnormalities [Eyelids - No Xanthelasma] : the eyelids demonstrated no xanthelasmas [Normal Oral Mucosa] : normal oral mucosa [No Oral Pallor] : no oral pallor [No Oral Cyanosis] : no oral cyanosis [Normal Jugular Venous A Waves Present] : normal jugular venous A waves present [Normal Jugular Venous V Waves Present] : normal jugular venous V waves present [No Jugular Venous Ash A Waves] : no jugular venous ash A waves [Respiration, Rhythm And Depth] : normal respiratory rhythm and effort [Exaggerated Use Of Accessory Muscles For Inspiration] : no accessory muscle use [Auscultation Breath Sounds / Voice Sounds] : lungs were clear to auscultation bilaterally [Heart Rate And Rhythm] : heart rate and rhythm were normal [Heart Sounds] : normal S1 and S2 [Murmurs] : no murmurs present [Abdomen Soft] : soft [Abdomen Tenderness] : non-tender [Abdomen Mass (___ Cm)] : no abdominal mass palpated [Abnormal Walk] : normal gait [Gait - Sufficient For Exercise Testing] : the gait was sufficient for exercise testing [Nail Clubbing] : no clubbing of the fingernails [Cyanosis, Localized] : no localized cyanosis [Petechial Hemorrhages (___cm)] : no petechial hemorrhages [Skin Color & Pigmentation] : normal skin color and pigmentation [] : no rash [No Venous Stasis] : no venous stasis [Skin Lesions] : no skin lesions [No Skin Ulcers] : no skin ulcer [No Xanthoma] : no  xanthoma was observed [Oriented To Time, Place, And Person] : oriented to person, place, and time [Affect] : the affect was normal [Mood] : the mood was normal [No Anxiety] : not feeling anxious

## 2019-11-05 NOTE — DISCUSSION/SUMMARY
[FreeTextEntry1] : The patient is doing very well clinically. The stress echo is performed reveals no evidence of exercise-induced myocardial ischemia and the LV function was normal throughout the test. I plan to continue the patient's current medications and we will followup in 6 months.

## 2019-11-12 ENCOUNTER — APPOINTMENT (OUTPATIENT)
Dept: GERIATRICS | Facility: CLINIC | Age: 84
End: 2019-11-12
Payer: MEDICARE

## 2019-11-12 VITALS
WEIGHT: 187 LBS | HEART RATE: 88 BPM | BODY MASS INDEX: 29.29 KG/M2 | OXYGEN SATURATION: 98 % | SYSTOLIC BLOOD PRESSURE: 158 MMHG | DIASTOLIC BLOOD PRESSURE: 70 MMHG | TEMPERATURE: 98.1 F

## 2019-11-12 VITALS — SYSTOLIC BLOOD PRESSURE: 130 MMHG | DIASTOLIC BLOOD PRESSURE: 70 MMHG

## 2019-11-12 PROCEDURE — 99214 OFFICE O/P EST MOD 30 MIN: CPT | Mod: 25

## 2019-11-12 PROCEDURE — G0439: CPT

## 2019-11-12 NOTE — ASSESSMENT
[FreeTextEntry1] : hoarseness\par will trial increase prilosec to 40mg daily x 6 weeks\par if no improvement would resume prior dosing and trial flonase + claritin standing x 6 weeks\par if both attempts unsuccessful would attempt trial off of losartan and discuss with nephrology about increase metoprolol\par abdominal spasms - no pain\par check BMP to assure no dehydration and will also check K+ and megnesium level\par no clear etiology\par has been happening for months per patient\par \par will follow up at [facility] in 6 weeks withme

## 2019-11-12 NOTE — HISTORY OF PRESENT ILLNESS
[0] : 0 [Spouse] : spouse [Retired] : retired from work [None] : The patient has no concerns about alcohol abuse [Never] : has never used illicit drugs [Compliant with medications] : compliant with medications [Fully Independent] : fully independent [Drives without concerns] : drives without concerns [No history of falls] : no history of falls [Seatbelts] : seatbelts [Safe Driving Habits] : safe driving habits [Smoke Detectors] : smoke detectors [Carbon Monoxide Detector] : carbon monoxide detector [Hot Water Temp <120F] : hot water temp <120F [Bathroom Grab Bars] : bathroom grab bars [Fall Prevention Measures] : fall prevention measures [PSH Reviewed and Updated] : past surgical history reviewed and updated [PMH Reviewed and Updated] : past medical history reviewed and updated [Family History Reviewed and Updated] : family history reviewed and updated [Medication and Allergies Reconciled] : medication and allergies reconciled [Over the Past 2 Weeks, Have You Felt Down, Depressed, or Hopeless?] : 1.) Over the past 2 weeks, have you felt down, depressed, or hopeless? No [Over the Past 2 Weeks, Have You Felt Little Interest or Pleasure Doing Things?] : 2.) Over the past 2 weeks, have you felt little interest or pleasure doing things? 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

## 2019-11-12 NOTE — REVIEW OF SYSTEMS
[Chills] : no chills [Fever] : no fever [Red Eyes] : eyes not red [Discharge From Eyes] : no purulent discharge from the eyes [Nosebleeds] : no nosebleeds [Nasal Discharge] : no nasal discharge [Sore Throat] : no sore throat [Hoarseness] : hoarseness [Chest Pain] : no chest pain [Palpitations] : no palpitations [Shortness Of Breath] : no shortness of breath [Wheezing] : no wheezing [Cough] : no cough [Abdominal Pain] : no abdominal pain [Vomiting] : no vomiting [Genital Lesion] : no genital lesions [Testicular Pain] : no testicular pain [Limb Pain] : no limb pain [Limb Swelling] : no limb swelling [Skin Lesions] : no skin lesions [Skin Wound] : no skin wound [Dizziness] : no dizziness [Fainting] : no fainting [Depression] : no depression [Anxiety] : no anxiety

## 2019-11-12 NOTE — PHYSICAL EXAM
[General Appearance - Alert] : alert [General Appearance - Well Nourished] : well nourished [General Appearance - In No Acute Distress] : in no acute distress [General Appearance - Well Developed] : well developed [Extraocular Movements] : extraocular movements were intact [Sclera] : the sclera and conjunctiva were normal [Normal Oral Mucosa] : normal oral mucosa [No Oral Pallor] : no oral pallor [Neck Appearance] : the appearance of the neck was normal [Respiration, Rhythm And Depth] : normal respiratory rhythm and effort [Exaggerated Use Of Accessory Muscles For Inspiration] : no accessory muscle use [Abdomen Soft] : soft [Bowel Sounds] : normal bowel sounds [Abdomen Tenderness] : non-tender [FreeTextEntry1] : slightly enlarged prostate, no nodules appreciated [Cervical Lymph Nodes Enlarged Anterior Bilaterally] : anterior cervical [Cervical Lymph Nodes Enlarged Posterior Bilaterally] : posterior cervical [Supraclavicular Lymph Nodes Enlarged Bilaterally] : supraclavicular [No CVA Tenderness] : no ~M costovertebral angle tenderness [Abnormal Walk] : normal gait [Skin Color & Pigmentation] : normal skin color and pigmentation [] : no rash [No Focal Deficits] : no focal deficits [Oriented To Time, Place, And Person] : oriented to person, place, and time [Affect] : the affect was normal [Impaired Insight] : insight and judgment were intact [Mood] : the mood was normal

## 2019-11-15 ENCOUNTER — RESULT REVIEW (OUTPATIENT)
Age: 84
End: 2019-11-15

## 2019-11-20 ENCOUNTER — APPOINTMENT (OUTPATIENT)
Dept: NEPHROLOGY | Facility: CLINIC | Age: 84
End: 2019-11-20
Payer: MEDICARE

## 2019-11-20 VITALS
HEIGHT: 67 IN | SYSTOLIC BLOOD PRESSURE: 136 MMHG | BODY MASS INDEX: 28.72 KG/M2 | WEIGHT: 183 LBS | HEART RATE: 68 BPM | DIASTOLIC BLOOD PRESSURE: 60 MMHG

## 2019-11-20 PROCEDURE — 36415 COLL VENOUS BLD VENIPUNCTURE: CPT

## 2019-11-20 PROCEDURE — 99214 OFFICE O/P EST MOD 30 MIN: CPT | Mod: 25

## 2019-11-20 NOTE — PHYSICAL EXAM
[General Appearance - Alert] : alert [Sclera] : the sclera and conjunctiva were normal [Outer Ear] : the ears and nose were normal in appearance [Neck Appearance] : the appearance of the neck was normal [] : no respiratory distress [Neck Cervical Mass (___cm)] : no neck mass was observed [Exaggerated Use Of Accessory Muscles For Inspiration] : no accessory muscle use [Apical Impulse] : the apical impulse was normal [Heart Sounds] : normal S1 and S2 [Bowel Sounds] : normal bowel sounds [Abdomen Tenderness] : non-tender [Abnormal Walk] : normal gait [Nail Clubbing] : no clubbing  or cyanosis of the fingernails [Sensation] : the sensory exam was normal to light touch and pinprick [Cranial Nerves] : cranial nerves 2-12 were intact [Oriented To Time, Place, And Person] : oriented to person, place, and time [Impaired Insight] : insight and judgment were intact

## 2019-11-20 NOTE — HISTORY OF PRESENT ILLNESS
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 review of chart shows creatinine 1.4 in 2013.\par \par  Seems to fluctuate between 1.1 to 1.4 since 2013.\par  Is taking losartan. UA showed only trace protein. \par \par States he was hospitalized in 7/2017 with SBP > 230.        \par \par  of note has vein harvested on L leg, so w/o diuretic exp edema -.

## 2019-11-21 LAB
ANION GAP SERPL CALC-SCNC: 15 MMOL/L
BUN SERPL-MCNC: 23 MG/DL
CALCIUM SERPL-MCNC: 9.6 MG/DL
CHLORIDE SERPL-SCNC: 104 MMOL/L
CO2 SERPL-SCNC: 21 MMOL/L
CREAT SERPL-MCNC: 1.47 MG/DL
GLUCOSE SERPL-MCNC: 138 MG/DL
POTASSIUM SERPL-SCNC: 4.9 MMOL/L
SODIUM SERPL-SCNC: 140 MMOL/L

## 2019-11-27 ENCOUNTER — OTHER (OUTPATIENT)
Age: 84
End: 2019-11-27

## 2019-12-03 ENCOUNTER — APPOINTMENT (OUTPATIENT)
Dept: ENDOCRINOLOGY | Facility: CLINIC | Age: 84
End: 2019-12-03
Payer: MEDICARE

## 2019-12-03 VITALS
SYSTOLIC BLOOD PRESSURE: 126 MMHG | WEIGHT: 187 LBS | OXYGEN SATURATION: 98 % | HEART RATE: 70 BPM | BODY MASS INDEX: 29.35 KG/M2 | DIASTOLIC BLOOD PRESSURE: 84 MMHG | HEIGHT: 67 IN

## 2019-12-03 LAB — GLUCOSE BLDC GLUCOMTR-MCNC: 140

## 2019-12-03 PROCEDURE — 82962 GLUCOSE BLOOD TEST: CPT

## 2019-12-03 PROCEDURE — 99214 OFFICE O/P EST MOD 30 MIN: CPT | Mod: 25

## 2019-12-03 NOTE — PHYSICAL EXAM
[Alert] : alert [No Acute Distress] : no acute distress [Well Nourished] : well nourished [Well Developed] : well developed [Normal Sclera/Conjunctiva] : normal sclera/conjunctiva [EOMI] : extra ocular movement intact [No Proptosis] : no proptosis [Thyroid Not Enlarged] : the thyroid was not enlarged [Normal Oropharynx] : the oropharynx was normal [No Accessory Muscle Use] : no accessory muscle use [No Thyroid Nodules] : there were no palpable thyroid nodules [No Respiratory Distress] : no respiratory distress [Normal Rate] : heart rate was normal  [Clear to Auscultation] : lungs were clear to auscultation bilaterally [Normal S1, S2] : normal S1 and S2 [Regular Rhythm] : with a regular rhythm [Pedal Pulses Normal] : the pedal pulses are present [Normal Bowel Sounds] : normal bowel sounds [No Edema] : there was no peripheral edema [Not Tender] : non-tender [Not Distended] : not distended [Soft] : abdomen soft [Post Cervical Nodes] : posterior cervical nodes [Anterior Cervical Nodes] : anterior cervical nodes [Axillary Nodes] : axillary nodes [No Spinal Tenderness] : no spinal tenderness [Spine Straight] : spine straight [No Stigmata of Cushings Syndrome] : no stigmata of cushings syndrome [No Rash] : no rash [Normal Gait] : normal gait [Normal Strength/Tone] : muscle strength and tone were normal [Right Foot Was Examined] : right foot ~C was examined [Left Foot Was Examined] : left foot ~C was examined [Full ROM] : with full range of motion [Normal] : normal [2+] : 2+ in the posterior tibialis [Normal Reflexes] : deep tendon reflexes were 2+ and symmetric [Normal Sensation on Monofilament Testing] : normal sensation on monofilament testing of lower extremities [No Tremors] : no tremors [Oriented x3] : oriented to person, place, and time [Acanthosis Nigricans] : no acanthosis nigricans [Position Sense Dec.] : normal position sense at the level of the toes [Vibration Dec.] : normal vibratory sensation at the level of the toes [Diminished Throughout Both Feet] : normal tactile sensation with monofilament testing throughout both feet

## 2019-12-03 NOTE — HISTORY OF PRESENT ILLNESS
[FreeTextEntry1] :   85 yo WM coming for f/u DM2, seen Medina Willson \par        Follow up for TDM2 with micro and macrovascular complications including CABG 2013, HTN, HLD, Nephropathy and neuropathy. \par        Dx 10 years ago. \par        Hga1c up to 7.3, now firher up to 7.6 despite increasing Truclitiy last visit \par        Regimen: \par        Metformin ER  to 500mg PO Qam and 500mg 2tab  QPM no diarrhea\par        off Januvia 100mg PO at Lunch stopped 8/2018\par        Glipizide ER 10mg ER PO qam and 5mg qpm\par        Trulicity 1.5mg SC weekly denies side effects \par        Tolerating medications well with no SE. \par        Fell at home Fell on to coffee table. Fractured cervical spine. 3 months ago, lost weight then gained back\par        Checks BG levels twice daily from BS log :\par        Sporadically throughout the day\par      fasting \par        has    after meals  once 224\par        Microvascular complications: \par        Nephropathy denies Cr 1.4 eGFR 42. Sees Dr. Cochran. decreased Clorthalidone twice a week\par        Neuropathy symptoms, Due for another appointment. \par        microfilament exam + neuropathy.\par        Retinopathy sees Dr. Fierro. Had Elyea Left no eye Injection has macular degeneration Visit Q6-8 weeks. \par        No recent loss of vision or blurry vision. \par        Macrovascular complications: \par        HTN improved. Sees Dr. Chopra. Toprolol ER 25mg PO BID, Clorthalidone 25mg PO Twice weekly. Echo and stress next week. Diovan dcd and now on Losartan 100mg PO Qdaily\par        CAD/CVA yes CABG 2013/Afib. Off Elaquis since the fall on 81mg PO Qdaily\par        Lipids , LDL 58, HDL 28. Lovaza 2g bid, Lipitor 40mg qpm\par        Vitamin B12 liquid once weekly \par        Vitamin D3 1000iu tab Twice daily\par        Iron 325mg PO Qdaily\par        TSH 1.97.

## 2019-12-04 NOTE — HISTORY OF PRESENT ILLNESS
[FreeTextEntry1] :   85 yo WM coming for f/u DM2, seen Medina Willson \par        Follow up for TDM2 with micro and macrovascular complications including CABG 2013, HTN, HLD, Nephropathy and neuropathy. \par        Dx 10 years ago. \par        Hga1c up to 7.3, now firher up to 7.6 despite increasing Truclitiy last visit \par        Regimen: \par        Metformin ER dec to 500mg PO Qam and 500mg QPM\par        off Januvia 100mg PO at Lunch stopped 8/2018\par        Glipizide ER 10mg ER PO BID\par        Trulicity 1.5mg SC weekly denies side effects \par        Tolerating medications well with no SE. \par        Fell at home Fell on to coffee table. Fractured cervical spine. 3 months ago, lost weight then gained back\par        Checks BG levels twice daily from BS log :\par        Sporadically throughout the day\par      fasting 101-174\par        has    after meals  once 224\par        Microvascular complications: \par        Nephropathy denies Cr 1.4 eGFR 42. Sees Dr. Cochran. decreased Clorthalidone twice a week\par        Neuropathy symptoms, Due for another appointment. \par        microfilament exam + neuropathy.\par        Retinopathy sees Dr. Fierro. Had Elyea Left eye Injection. Visit Q6-8 weeks. \par        No recent loss of vision or blurry vision. \par        Macrovascular complications: \par        HTN improved. Sees Dr. Chopra. Toprolol ER 25mg PO BID, Clorthalidone 25mg PO Twice weekly. Echo and stress next week. Diovan dcd and now on Losartan 100mg PO Qdaily\par        CAD/CVA yes CABG 2013/Afib. Off Elaquis since the fall on 81mg PO Qdaily\par        Lipids , LDL 58, HDL 28. Lovaza 2g bid, Lipitor 40mg qpm\par        Vitamin B12 liquid once weekly \par        Vitamin D3 1000iu tab Twice daily\par        Iron 325mg PO Qdaily\par        TSH 1.97. Supervision was available

## 2019-12-18 ENCOUNTER — APPOINTMENT (OUTPATIENT)
Dept: GERIATRICS | Facility: CLINIC | Age: 84
End: 2019-12-18
Payer: MEDICARE

## 2019-12-18 VITALS
SYSTOLIC BLOOD PRESSURE: 140 MMHG | RESPIRATION RATE: 18 BRPM | HEART RATE: 62 BPM | DIASTOLIC BLOOD PRESSURE: 62 MMHG | TEMPERATURE: 97.6 F

## 2019-12-18 DIAGNOSIS — M15.9 POLYOSTEOARTHRITIS, UNSPECIFIED: ICD-10-CM

## 2019-12-18 DIAGNOSIS — J30.2 OTHER SEASONAL ALLERGIC RHINITIS: ICD-10-CM

## 2019-12-18 PROCEDURE — 99215 OFFICE O/P EST HI 40 MIN: CPT

## 2019-12-18 RX ORDER — OMEPRAZOLE MAGNESIUM 20 MG/1
20 TABLET, DELAYED RELEASE ORAL DAILY
Refills: 0 | Status: ACTIVE | COMMUNITY
Start: 2019-12-18

## 2019-12-18 NOTE — ASSESSMENT
[FreeTextEntry1] : hoarseness\par could be related to vocal cord dysfunction\par failed Prilosec and given its ability to worsen kidney function will dc he choses to use prilosec PRN basis\par will trial flonase + claritin standing x 6 weeks\par \par if both attempts unsuccessful would attempt trial off of losartan and discuss with nephrology about increase metoprolol\par \par abdominal spasms - no pain\par no clear etiology\par has been happening for months per patient\par \par

## 2019-12-18 NOTE — PHYSICAL EXAM
[General Appearance - Alert] : alert [General Appearance - In No Acute Distress] : in no acute distress [General Appearance - Well Nourished] : well nourished [General Appearance - Well Developed] : well developed [Sclera] : the sclera and conjunctiva were normal [Extraocular Movements] : extraocular movements were intact [Normal Oral Mucosa] : normal oral mucosa [No Oral Pallor] : no oral pallor [Neck Appearance] : the appearance of the neck was normal [Respiration, Rhythm And Depth] : normal respiratory rhythm and effort [Exaggerated Use Of Accessory Muscles For Inspiration] : no accessory muscle use [Bowel Sounds] : normal bowel sounds [Abdomen Soft] : soft [Abdomen Tenderness] : non-tender [Cervical Lymph Nodes Enlarged Posterior Bilaterally] : posterior cervical [Cervical Lymph Nodes Enlarged Anterior Bilaterally] : anterior cervical [Supraclavicular Lymph Nodes Enlarged Bilaterally] : supraclavicular [No CVA Tenderness] : no ~M costovertebral angle tenderness [Abnormal Walk] : normal gait [Skin Color & Pigmentation] : normal skin color and pigmentation [] : no rash [No Focal Deficits] : no focal deficits [Oriented To Time, Place, And Person] : oriented to person, place, and time [Impaired Insight] : insight and judgment were intact [Affect] : the affect was normal [Mood] : the mood was normal [FreeTextEntry1] : slightly enlarged prostate, no nodules appreciated

## 2019-12-18 NOTE — HISTORY OF PRESENT ILLNESS
[0] : 1) Little interest or pleasure doing things: Not at all [PHQ-2 Score ___] : PHQ-2 Score [unfilled] [FreeTextEntry1] : has seen ENT Dr. saavedra\callum who did note vocal cord dysfunction\par no improvement with hoarseness with PPI\callum has to cut back on prilosec as 40mg was not tolerated (diarrhea)\par has been on 20mg daily for about one month with no improvement in voice quality

## 2019-12-18 NOTE — REVIEW OF SYSTEMS
[Hoarseness] : hoarseness [Fever] : no fever [Red Eyes] : eyes not red [Discharge From Eyes] : no purulent discharge from the eyes [Chills] : no chills [Nosebleeds] : no nosebleeds [Nasal Discharge] : no nasal discharge [Sore Throat] : no sore throat [Shortness Of Breath] : no shortness of breath [Palpitations] : no palpitations [Chest Pain] : no chest pain [Wheezing] : no wheezing [Abdominal Pain] : no abdominal pain [Cough] : no cough [Vomiting] : no vomiting [Genital Lesion] : no genital lesions [Testicular Pain] : no testicular pain [Skin Lesions] : no skin lesions [Limb Pain] : no limb pain [Limb Swelling] : no limb swelling [Dizziness] : no dizziness [Skin Wound] : no skin wound [Fainting] : no fainting [Anxiety] : no anxiety [Depression] : no depression

## 2020-01-03 ENCOUNTER — MEDICATION RENEWAL (OUTPATIENT)
Age: 85
End: 2020-01-03

## 2020-01-24 ENCOUNTER — RX RENEWAL (OUTPATIENT)
Age: 85
End: 2020-01-24

## 2020-02-03 ENCOUNTER — APPOINTMENT (OUTPATIENT)
Dept: GERIATRICS | Facility: CLINIC | Age: 85
End: 2020-02-03
Payer: MEDICARE

## 2020-02-03 VITALS
WEIGHT: 186 LBS | SYSTOLIC BLOOD PRESSURE: 140 MMHG | TEMPERATURE: 97.6 F | OXYGEN SATURATION: 98 % | HEART RATE: 83 BPM | BODY MASS INDEX: 29.13 KG/M2 | DIASTOLIC BLOOD PRESSURE: 67 MMHG

## 2020-02-03 PROCEDURE — 99214 OFFICE O/P EST MOD 30 MIN: CPT

## 2020-02-03 NOTE — PHYSICAL EXAM
[General Appearance - In No Acute Distress] : in no acute distress [General Appearance - Alert] : alert [General Appearance - Well Nourished] : well nourished [General Appearance - Well Developed] : well developed [Normal Oral Mucosa] : normal oral mucosa [Extraocular Movements] : extraocular movements were intact [Sclera] : the sclera and conjunctiva were normal [Neck Appearance] : the appearance of the neck was normal [No Oral Pallor] : no oral pallor [Respiration, Rhythm And Depth] : normal respiratory rhythm and effort [Exaggerated Use Of Accessory Muscles For Inspiration] : no accessory muscle use [Abdomen Tenderness] : non-tender [Bowel Sounds] : normal bowel sounds [Abdomen Soft] : soft [Cervical Lymph Nodes Enlarged Posterior Bilaterally] : posterior cervical [Cervical Lymph Nodes Enlarged Anterior Bilaterally] : anterior cervical [No CVA Tenderness] : no ~M costovertebral angle tenderness [Abnormal Walk] : normal gait [Supraclavicular Lymph Nodes Enlarged Bilaterally] : supraclavicular [] : no rash [Skin Color & Pigmentation] : normal skin color and pigmentation [No Focal Deficits] : no focal deficits [Oriented To Time, Place, And Person] : oriented to person, place, and time [Affect] : the affect was normal [Impaired Insight] : insight and judgment were intact [Mood] : the mood was normal

## 2020-02-03 NOTE — REVIEW OF SYSTEMS
[Fever] : no fever [Chills] : no chills [Discharge From Eyes] : no purulent discharge from the eyes [Red Eyes] : eyes not red [Nasal Discharge] : no nasal discharge [Nosebleeds] : no nosebleeds [Sore Throat] : no sore throat [Chest Pain] : no chest pain [Hoarseness] : hoarseness [Palpitations] : no palpitations [Wheezing] : no wheezing [Shortness Of Breath] : no shortness of breath [Cough] : no cough [Genital Lesion] : no genital lesions [Vomiting] : no vomiting [Abdominal Pain] : no abdominal pain [Limb Pain] : no limb pain [Testicular Pain] : no testicular pain [Skin Lesions] : no skin lesions [Skin Wound] : no skin wound [Limb Swelling] : no limb swelling [Fainting] : no fainting [Dizziness] : no dizziness [Anxiety] : no anxiety [Depression] : no depression

## 2020-02-03 NOTE — SOCIAL HISTORY
[One fall no injury in past year] : Patient reported one fall in the past year without injury [Fully functional (using the telephone, shopping, preparing meals, housekeeping, doing laundry, using transportation,] : Fully functional and needs no help or supervision to perform IADLs (using the telephone, shopping, preparing meals, housekeeping, doing laundry, using transportation, managing medications and managing finances) [Fully functional (bathing, dressing, toileting, transferring, walking, feeding)] : Fully functional (bathing, dressing, toileting, transferring, walking, feeding) [Smoke Detector] : smoke detector [Canes] : patricia [Carbon Monoxide Detector] : carbon monoxide detector [Grab Bars] : grab bars [Shower Chair] : shower chair [Seat Belt] :  uses seat belt [Driving] : driving

## 2020-02-03 NOTE — ASSESSMENT
[FreeTextEntry1] : hoarseness\par could be related to vocal cord dysfunction\par failed Prilosecas well as flonase + claritin standing x 6 weeks\par now will stop flonase and claritin\par continue exercises per ENT\par in future if worsening symptoms could consider trial off of losartan but hold off for now\par \par abdominal spasms - no pain\par no clear etiology\par has been happening for months per patient\par will RTC in June and annual in fall 2020\par \par

## 2020-02-03 NOTE — HISTORY OF PRESENT ILLNESS
[0] : 2) Feeling down, depressed, or hopeless: Not at all [PHQ-2 Score ___] : PHQ-2 Score [unfilled] [FreeTextEntry1] : has seen ENT Dr. saavedra\par who did note vocal cord dysfunction\par no improvement with hoarseness with PPI\par also has not had improvement with claritin and flonase\par \par

## 2020-02-12 ENCOUNTER — APPOINTMENT (OUTPATIENT)
Dept: GERIATRICS | Facility: CLINIC | Age: 85
End: 2020-02-12
Payer: MEDICARE

## 2020-02-12 VITALS
DIASTOLIC BLOOD PRESSURE: 60 MMHG | SYSTOLIC BLOOD PRESSURE: 120 MMHG | HEART RATE: 80 BPM | RESPIRATION RATE: 18 BRPM | TEMPERATURE: 98.4 F

## 2020-02-12 PROCEDURE — 99214 OFFICE O/P EST MOD 30 MIN: CPT

## 2020-02-12 NOTE — ASSESSMENT
[FreeTextEntry1] : Pt should try to eat plain foods, avoid milk\par Discussed the BRAT diet\par Make sure to hydrate well\par Stop immodium\par Also educated him to seek emergent medical care if\par any abdominal pain or he becomes very weak or dehydrated\par he verbalized understanding

## 2020-02-12 NOTE — HISTORY OF PRESENT ILLNESS
[FreeTextEntry1] : Has had loose and unformed stool x 1 week\par has lost 5 lbs,  does have appetite - this am did not eat\par Has taken immodium several times which did not help.\par Since yesterday diarrhea worsened and he has had liquid stool diarrhea multiple episodes\par during the overnight\par No abdominal pain, no fever, no nausea or vomiting\par

## 2020-02-13 ENCOUNTER — RESULT REVIEW (OUTPATIENT)
Age: 85
End: 2020-02-13

## 2020-02-25 ENCOUNTER — RX RENEWAL (OUTPATIENT)
Age: 85
End: 2020-02-25

## 2020-03-17 ENCOUNTER — APPOINTMENT (OUTPATIENT)
Dept: ENDOCRINOLOGY | Facility: CLINIC | Age: 85
End: 2020-03-17
Payer: MEDICARE

## 2020-03-17 VITALS
WEIGHT: 185 LBS | RESPIRATION RATE: 16 BRPM | BODY MASS INDEX: 29.03 KG/M2 | SYSTOLIC BLOOD PRESSURE: 136 MMHG | OXYGEN SATURATION: 100 % | DIASTOLIC BLOOD PRESSURE: 72 MMHG | HEART RATE: 61 BPM | HEIGHT: 67 IN

## 2020-03-17 LAB — GLUCOSE BLDC GLUCOMTR-MCNC: 96

## 2020-03-17 PROCEDURE — 82962 GLUCOSE BLOOD TEST: CPT

## 2020-05-12 ENCOUNTER — APPOINTMENT (OUTPATIENT)
Dept: CARDIOLOGY | Facility: CLINIC | Age: 85
End: 2020-05-12
Payer: MEDICARE

## 2020-05-12 ENCOUNTER — NON-APPOINTMENT (OUTPATIENT)
Age: 85
End: 2020-05-12

## 2020-05-12 VITALS
BODY MASS INDEX: 29.19 KG/M2 | HEIGHT: 67 IN | DIASTOLIC BLOOD PRESSURE: 70 MMHG | SYSTOLIC BLOOD PRESSURE: 122 MMHG | WEIGHT: 186 LBS | HEART RATE: 67 BPM

## 2020-05-12 PROCEDURE — 93000 ELECTROCARDIOGRAM COMPLETE: CPT

## 2020-05-12 PROCEDURE — 99213 OFFICE O/P EST LOW 20 MIN: CPT

## 2020-05-12 NOTE — DISCUSSION/SUMMARY
[FreeTextEntry1] : The patient's clinical condition is stable. There have been no ischemic symptoms or palpitations. There have been no recurrences of atrial fibrillation. The examination is satisfactory. Based on my examination and assessment pleased with the examination. Current medications will be continued. Followup in 6 months.The patient is encouraged to maintain a program of walking exercise.

## 2020-05-12 NOTE — PHYSICAL EXAM
[General Appearance - Well Developed] : well developed [Normal Appearance] : normal appearance [Well Groomed] : well groomed [General Appearance - Well Nourished] : well nourished [No Deformities] : no deformities [General Appearance - In No Acute Distress] : no acute distress [Normal Conjunctiva] : the conjunctiva exhibited no abnormalities [Eyelids - No Xanthelasma] : the eyelids demonstrated no xanthelasmas [Normal Oral Mucosa] : normal oral mucosa [No Oral Pallor] : no oral pallor [No Oral Cyanosis] : no oral cyanosis [Normal Jugular Venous A Waves Present] : normal jugular venous A waves present [No Jugular Venous Ash A Waves] : no jugular venous ash A waves [Normal Jugular Venous V Waves Present] : normal jugular venous V waves present [Heart Rate And Rhythm] : heart rate and rhythm were normal [Heart Sounds] : normal S1 and S2 [Murmurs] : no murmurs present [Auscultation Breath Sounds / Voice Sounds] : lungs were clear to auscultation bilaterally [Respiration, Rhythm And Depth] : normal respiratory rhythm and effort [Exaggerated Use Of Accessory Muscles For Inspiration] : no accessory muscle use [Abdomen Soft] : soft [Abdomen Tenderness] : non-tender [Abdomen Mass (___ Cm)] : no abdominal mass palpated [Abnormal Walk] : normal gait [Gait - Sufficient For Exercise Testing] : the gait was sufficient for exercise testing [Nail Clubbing] : no clubbing of the fingernails [Cyanosis, Localized] : no localized cyanosis [Petechial Hemorrhages (___cm)] : no petechial hemorrhages [FreeTextEntry1] : no edema; post tibial pulses 2+ [Skin Color & Pigmentation] : normal skin color and pigmentation [] : no rash [No Venous Stasis] : no venous stasis [Skin Lesions] : no skin lesions [No Skin Ulcers] : no skin ulcer [No Xanthoma] : no  xanthoma was observed [Mood] : the mood was normal [Oriented To Time, Place, And Person] : oriented to person, place, and time [Affect] : the affect was normal [No Anxiety] : not feeling anxious

## 2020-05-20 ENCOUNTER — RESULT REVIEW (OUTPATIENT)
Age: 85
End: 2020-05-20

## 2020-05-20 ENCOUNTER — APPOINTMENT (OUTPATIENT)
Dept: NEPHROLOGY | Facility: CLINIC | Age: 85
End: 2020-05-20
Payer: MEDICARE

## 2020-05-20 ENCOUNTER — RX RENEWAL (OUTPATIENT)
Age: 85
End: 2020-05-20

## 2020-05-20 VITALS
BODY MASS INDEX: 28.41 KG/M2 | TEMPERATURE: 98.7 F | HEART RATE: 60 BPM | SYSTOLIC BLOOD PRESSURE: 140 MMHG | WEIGHT: 181 LBS | DIASTOLIC BLOOD PRESSURE: 50 MMHG | HEIGHT: 67 IN

## 2020-05-20 PROCEDURE — 36415 COLL VENOUS BLD VENIPUNCTURE: CPT

## 2020-05-20 PROCEDURE — 99214 OFFICE O/P EST MOD 30 MIN: CPT | Mod: 25

## 2020-05-20 NOTE — PHYSICAL EXAM
[General Appearance - Alert] : alert [Sclera] : the sclera and conjunctiva were normal [Outer Ear] : the ears and nose were normal in appearance [Neck Cervical Mass (___cm)] : no neck mass was observed [Neck Appearance] : the appearance of the neck was normal [] : no respiratory distress [Exaggerated Use Of Accessory Muscles For Inspiration] : no accessory muscle use [Apical Impulse] : the apical impulse was normal [Heart Sounds] : normal S1 and S2 [Bowel Sounds] : normal bowel sounds [Abdomen Tenderness] : non-tender [Nail Clubbing] : no clubbing  or cyanosis of the fingernails [Abnormal Walk] : normal gait [Cranial Nerves] : cranial nerves 2-12 were intact [Oriented To Time, Place, And Person] : oriented to person, place, and time [Sensation] : the sensory exam was normal to light touch and pinprick [Impaired Insight] : insight and judgment were intact

## 2020-05-27 ENCOUNTER — RX RENEWAL (OUTPATIENT)
Age: 85
End: 2020-05-27

## 2020-05-31 ENCOUNTER — TRANSCRIPTION ENCOUNTER (OUTPATIENT)
Age: 85
End: 2020-05-31

## 2020-06-01 ENCOUNTER — APPOINTMENT (OUTPATIENT)
Dept: GERIATRICS | Facility: CLINIC | Age: 85
End: 2020-06-01
Payer: MEDICARE

## 2020-06-01 VITALS
DIASTOLIC BLOOD PRESSURE: 63 MMHG | HEART RATE: 80 BPM | OXYGEN SATURATION: 98 % | BODY MASS INDEX: 29.13 KG/M2 | SYSTOLIC BLOOD PRESSURE: 140 MMHG | TEMPERATURE: 98.2 F | WEIGHT: 186 LBS

## 2020-06-01 PROCEDURE — 99214 OFFICE O/P EST MOD 30 MIN: CPT

## 2020-06-01 NOTE — ASSESSMENT
[FreeTextEntry1] : hoarseness\par stable\par continue exercises per ENT\par in future if worsening symptoms could consider trial off of losartan but hold off for now\par \par abdominal spasms - no pain\par no clear etiology\par stable, getting less frequent per patient\par \par will RTC in fall 2020 for physical with [facility] form to be updated/reviewed\par \par

## 2020-06-01 NOTE — SOCIAL HISTORY
[No falls in past year] : Patient reported no falls in the past year [Fully functional (bathing, dressing, toileting, transferring, walking, feeding)] : Fully functional (bathing, dressing, toileting, transferring, walking, feeding) [Fully functional (using the telephone, shopping, preparing meals, housekeeping, doing laundry, using transportation,] : Fully functional and needs no help or supervision to perform IADLs (using the telephone, shopping, preparing meals, housekeeping, doing laundry, using transportation, managing medications and managing finances) [Canes] : patricia [Smoke Detector] : smoke detector [Carbon Monoxide Detector] : carbon monoxide detector [Safety elements used in home] : safety elements used in home [Grab Bars] : grab bars [Shower Chair] : shower chair [Night Light] : night light [Anti-Slip Measures] : anti-slip measures [Seat Belt] :  uses seat belt [Driving] : driving

## 2020-06-01 NOTE — HISTORY OF PRESENT ILLNESS
[0] : 2) Feeling down, depressed, or hopeless: Not at all [PHQ-2 Score ___] : PHQ-2 Score [unfilled] [FreeTextEntry1] : hoarseness at baseline\par no response to PPI or with claritin and flonase\par now using claritin and flonase for allergy symptoms\par quarantining at [facility]\par but able to walk outside with partner in good weather\par due for A1C testing with endocrine next week\par \par

## 2020-06-01 NOTE — PHYSICAL EXAM
[General Appearance - In No Acute Distress] : in no acute distress [General Appearance - Alert] : alert [General Appearance - Well Nourished] : well nourished [General Appearance - Well Developed] : well developed [Sclera] : the sclera and conjunctiva were normal [Extraocular Movements] : extraocular movements were intact [Normal Oral Mucosa] : normal oral mucosa [No Oral Pallor] : no oral pallor [Neck Appearance] : the appearance of the neck was normal [Exaggerated Use Of Accessory Muscles For Inspiration] : no accessory muscle use [Respiration, Rhythm And Depth] : normal respiratory rhythm and effort [Bowel Sounds] : normal bowel sounds [Abdomen Soft] : soft [Abdomen Tenderness] : non-tender [Cervical Lymph Nodes Enlarged Posterior Bilaterally] : posterior cervical [Supraclavicular Lymph Nodes Enlarged Bilaterally] : supraclavicular [Cervical Lymph Nodes Enlarged Anterior Bilaterally] : anterior cervical [No CVA Tenderness] : no ~M costovertebral angle tenderness [Abnormal Walk] : normal gait [Skin Color & Pigmentation] : normal skin color and pigmentation [No Focal Deficits] : no focal deficits [] : no rash [Oriented To Time, Place, And Person] : oriented to person, place, and time [Impaired Insight] : insight and judgment were intact [Affect] : the affect was normal [Mood] : the mood was normal

## 2020-06-01 NOTE — REVIEW OF SYSTEMS
[Fever] : no fever [Chills] : no chills [Red Eyes] : eyes not red [Discharge From Eyes] : no purulent discharge from the eyes [Nosebleeds] : no nosebleeds [Nasal Discharge] : no nasal discharge [Sore Throat] : no sore throat [Hoarseness] : hoarseness [Chest Pain] : no chest pain [Palpitations] : no palpitations [Shortness Of Breath] : no shortness of breath [Wheezing] : no wheezing [Cough] : no cough [Abdominal Pain] : no abdominal pain [Vomiting] : no vomiting [Genital Lesion] : no genital lesions [Testicular Pain] : no testicular pain [Limb Pain] : no limb pain [Limb Swelling] : no limb swelling [Skin Wound] : no skin wound [Skin Lesions] : no skin lesions [Dizziness] : no dizziness [Fainting] : no fainting [Anxiety] : no anxiety [Depression] : no depression

## 2020-06-15 ENCOUNTER — RX RENEWAL (OUTPATIENT)
Age: 85
End: 2020-06-15

## 2020-06-18 ENCOUNTER — APPOINTMENT (OUTPATIENT)
Dept: ENDOCRINOLOGY | Facility: CLINIC | Age: 85
End: 2020-06-18
Payer: MEDICARE

## 2020-06-18 ENCOUNTER — RESULT CHARGE (OUTPATIENT)
Age: 85
End: 2020-06-18

## 2020-06-18 VITALS
OXYGEN SATURATION: 98 % | HEIGHT: 67 IN | HEART RATE: 82 BPM | BODY MASS INDEX: 29.19 KG/M2 | WEIGHT: 186 LBS | SYSTOLIC BLOOD PRESSURE: 124 MMHG | TEMPERATURE: 98.2 F | DIASTOLIC BLOOD PRESSURE: 60 MMHG

## 2020-06-18 LAB — GLUCOSE BLDC GLUCOMTR-MCNC: 131

## 2020-06-18 PROCEDURE — 99214 OFFICE O/P EST MOD 30 MIN: CPT

## 2020-06-18 NOTE — HISTORY OF PRESENT ILLNESS
[FreeTextEntry1] :   86 yo WM coming for f/u DM2, seen Medina Willson \par        Follow up for TDM2 with micro and macrovascular complications including CABG 2013, HTN, HLD, Nephropathy and neuropathy. \par        Dx 10 years ago. \par        Hga1c up to 7.3, now firher up to 7.6 despite increasing Truclitiy last visit \par        Regimen: \par        Metformin ER  to 500mg PO Qam and 500mg 2tab  QPM no diarrhea\par        off Januvia 100mg PO at Lunch stopped 8/2018\par        Glipizide ER 10mg ER PO qam and 5mg qpm\par        Trulicity 1.5mg SC weekly denies side effects \par        Tolerating medications well with no SE. \par        Fell at home Fell on to coffee table. Fractured cervical spine. 3 months ago, lost weight then gained back\par        Checks BG levels twice daily from BS log :\par        Sporadically throughout the day\par      fasting \par        has    after meals  once 224\par        Microvascular complications: \par        Nephropathy denies Cr 1.4 eGFR 42. Sees Dr. Cochran. decreased Clorthalidone twice a week\par        Neuropathy symptoms, Due for another appointment. \par        microfilament exam + neuropathy.\par        Retinopathy sees Dr. Fierro. Had Elyea Left no eye Injection has macular degeneration Visit Q6-8 weeks. \par        No recent loss of vision or blurry vision. \par        Macrovascular complications: \par        HTN improved. Sees Dr. Chopra. Toprolol ER 25mg PO BID, Clorthalidone 25mg PO Twice weekly. Echo and stress next week. Diovan dcd and now on Losartan 100mg PO Qdaily\par        CAD/CVA yes CABG 2013/Afib. Off Elaquis since the fall on 81mg PO Qdaily\par        Lipids , LDL 58, HDL 28. Lovaza 2g bid, Lipitor 40mg qpm\par        Vitamin B12 liquid once weekly \par        Vitamin D3 1000iu tab Twice daily\par        Iron 325mg PO Qdaily\par        TSH 1.97.

## 2020-06-29 ENCOUNTER — RX RENEWAL (OUTPATIENT)
Age: 85
End: 2020-06-29

## 2020-07-23 ENCOUNTER — RX RENEWAL (OUTPATIENT)
Age: 85
End: 2020-07-23

## 2020-08-14 ENCOUNTER — RX RENEWAL (OUTPATIENT)
Age: 85
End: 2020-08-14

## 2020-09-18 ENCOUNTER — RX RENEWAL (OUTPATIENT)
Age: 85
End: 2020-09-18

## 2020-09-23 ENCOUNTER — RESULT REVIEW (OUTPATIENT)
Age: 85
End: 2020-09-23

## 2020-09-23 ENCOUNTER — APPOINTMENT (OUTPATIENT)
Dept: NEPHROLOGY | Facility: CLINIC | Age: 85
End: 2020-09-23
Payer: MEDICARE

## 2020-09-23 VITALS
WEIGHT: 180 LBS | OXYGEN SATURATION: 99 % | DIASTOLIC BLOOD PRESSURE: 60 MMHG | BODY MASS INDEX: 28.25 KG/M2 | SYSTOLIC BLOOD PRESSURE: 150 MMHG | HEIGHT: 67 IN | HEART RATE: 74 BPM | TEMPERATURE: 98 F

## 2020-09-23 PROCEDURE — 99213 OFFICE O/P EST LOW 20 MIN: CPT | Mod: 25

## 2020-09-23 PROCEDURE — G0008: CPT

## 2020-09-23 PROCEDURE — 90662 IIV NO PRSV INCREASED AG IM: CPT

## 2020-09-23 PROCEDURE — 36415 COLL VENOUS BLD VENIPUNCTURE: CPT

## 2020-09-23 NOTE — PHYSICAL EXAM
[General Appearance - Alert] : alert [Sclera] : the sclera and conjunctiva were normal [Outer Ear] : the ears and nose were normal in appearance [Neck Appearance] : the appearance of the neck was normal [Neck Cervical Mass (___cm)] : no neck mass was observed [Exaggerated Use Of Accessory Muscles For Inspiration] : no accessory muscle use [Apical Impulse] : the apical impulse was normal [Heart Sounds] : normal S1 and S2 [Edema] : there was no peripheral edema [Veins - Varicosity Changes] : there were no varicosital changes [Bowel Sounds] : normal bowel sounds [Abdomen Tenderness] : non-tender [Abnormal Walk] : normal gait [Nail Clubbing] : no clubbing  or cyanosis of the fingernails [Skin Color & Pigmentation] : normal skin color and pigmentation [] : no rash [Cranial Nerves] : cranial nerves 2-12 were intact [Sensation] : the sensory exam was normal to light touch and pinprick [Oriented To Time, Place, And Person] : oriented to person, place, and time [Impaired Insight] : insight and judgment were intact

## 2020-09-23 NOTE — HISTORY OF PRESENT ILLNESS
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 review of chart shows creatinine 1.4 in 2013.\par \par  Seems to fluctuate between 1.1 to 1.4 since 2013 - few elevated readings of 1.6, likely related to hydration;\par \par  Is taking losartan. UA showed only trace protein. \par \par States he was hospitalized in 7/2017 with SBP > 230.        \par \par  of note has vein harvested on L leg, so w/o diuretic exp edema -.

## 2020-10-09 ENCOUNTER — RESULT REVIEW (OUTPATIENT)
Age: 85
End: 2020-10-09

## 2020-10-09 ENCOUNTER — APPOINTMENT (OUTPATIENT)
Dept: NEPHROLOGY | Facility: CLINIC | Age: 85
End: 2020-10-09
Payer: MEDICARE

## 2020-10-09 PROCEDURE — 36415 COLL VENOUS BLD VENIPUNCTURE: CPT

## 2020-10-12 ENCOUNTER — RX RENEWAL (OUTPATIENT)
Age: 85
End: 2020-10-12

## 2020-10-16 ENCOUNTER — RESULT REVIEW (OUTPATIENT)
Age: 85
End: 2020-10-16

## 2020-10-16 ENCOUNTER — APPOINTMENT (OUTPATIENT)
Dept: NEPHROLOGY | Facility: CLINIC | Age: 85
End: 2020-10-16
Payer: MEDICARE

## 2020-10-16 VITALS — DIASTOLIC BLOOD PRESSURE: 50 MMHG | HEART RATE: 72 BPM | SYSTOLIC BLOOD PRESSURE: 134 MMHG

## 2020-10-16 PROCEDURE — 36415 COLL VENOUS BLD VENIPUNCTURE: CPT

## 2020-10-19 ENCOUNTER — APPOINTMENT (OUTPATIENT)
Dept: NEPHROLOGY | Facility: CLINIC | Age: 85
End: 2020-10-19
Payer: MEDICARE

## 2020-10-19 ENCOUNTER — RESULT REVIEW (OUTPATIENT)
Age: 85
End: 2020-10-19

## 2020-10-19 VITALS — OXYGEN SATURATION: 99 % | SYSTOLIC BLOOD PRESSURE: 158 MMHG | DIASTOLIC BLOOD PRESSURE: 58 MMHG | HEART RATE: 68 BPM

## 2020-10-19 PROCEDURE — 36415 COLL VENOUS BLD VENIPUNCTURE: CPT

## 2020-10-27 ENCOUNTER — APPOINTMENT (OUTPATIENT)
Dept: NEPHROLOGY | Facility: CLINIC | Age: 85
End: 2020-10-27
Payer: MEDICARE

## 2020-10-27 ENCOUNTER — RESULT REVIEW (OUTPATIENT)
Age: 85
End: 2020-10-27

## 2020-10-27 VITALS — SYSTOLIC BLOOD PRESSURE: 140 MMHG | DIASTOLIC BLOOD PRESSURE: 60 MMHG | HEART RATE: 72 BPM

## 2020-10-27 PROCEDURE — 93784 AMBL BP MNTR W/SOFTWARE: CPT

## 2020-10-27 PROCEDURE — 36415 COLL VENOUS BLD VENIPUNCTURE: CPT

## 2020-11-09 ENCOUNTER — RESULT REVIEW (OUTPATIENT)
Age: 85
End: 2020-11-09

## 2020-11-09 ENCOUNTER — APPOINTMENT (OUTPATIENT)
Dept: NEPHROLOGY | Facility: CLINIC | Age: 85
End: 2020-11-09
Payer: MEDICARE

## 2020-11-09 VITALS — HEART RATE: 70 BPM | DIASTOLIC BLOOD PRESSURE: 60 MMHG | SYSTOLIC BLOOD PRESSURE: 148 MMHG

## 2020-11-09 PROCEDURE — 36415 COLL VENOUS BLD VENIPUNCTURE: CPT

## 2020-11-10 RX ORDER — LOSARTAN POTASSIUM 100 MG/1
100 TABLET, FILM COATED ORAL
Qty: 90 | Refills: 3 | Status: DISCONTINUED | COMMUNITY
Start: 2019-01-10 | End: 2020-11-10

## 2020-11-11 ENCOUNTER — APPOINTMENT (OUTPATIENT)
Dept: CARDIOLOGY | Facility: CLINIC | Age: 85
End: 2020-11-11
Payer: MEDICARE

## 2020-11-11 ENCOUNTER — NON-APPOINTMENT (OUTPATIENT)
Age: 85
End: 2020-11-11

## 2020-11-11 VITALS
HEART RATE: 73 BPM | BODY MASS INDEX: 28.09 KG/M2 | WEIGHT: 179 LBS | HEIGHT: 67 IN | SYSTOLIC BLOOD PRESSURE: 140 MMHG | DIASTOLIC BLOOD PRESSURE: 60 MMHG

## 2020-11-11 PROCEDURE — 99214 OFFICE O/P EST MOD 30 MIN: CPT

## 2020-11-11 PROCEDURE — 93000 ELECTROCARDIOGRAM COMPLETE: CPT

## 2020-11-11 RX ORDER — FLUTICASONE PROPIONATE 50 UG/1
50 SPRAY, METERED NASAL
Qty: 1 | Refills: 2 | Status: DISCONTINUED | COMMUNITY
Start: 2019-12-18 | End: 2020-11-11

## 2020-11-11 NOTE — DISCUSSION/SUMMARY
[FreeTextEntry1] : Patient's cardiac exam today is satisfactory. Lately there has been a problem of an elevated potassium and losartan dosages have been decreased along with some increase in chlorthalidone  dosage.Patient's potassium done yesterday was 5.4 the creatinine was 1.48 reflecting an improvement from his last determination.in order to avoid the danger of hyperkalemia and elected to discontinue losartan completely at the present time.if additional antihypertensive therapy proves to be necessary I would recommend the addition of amlodipine 5 mg daily.

## 2020-11-11 NOTE — PHYSICAL EXAM
[General Appearance - Well Developed] : well developed [Normal Appearance] : normal appearance [Well Groomed] : well groomed [General Appearance - Well Nourished] : well nourished [No Deformities] : no deformities [General Appearance - In No Acute Distress] : no acute distress [Normal Conjunctiva] : the conjunctiva exhibited no abnormalities [Eyelids - No Xanthelasma] : the eyelids demonstrated no xanthelasmas [Normal Oral Mucosa] : normal oral mucosa [No Oral Pallor] : no oral pallor [No Oral Cyanosis] : no oral cyanosis [Normal Jugular Venous A Waves Present] : normal jugular venous A waves present [Normal Jugular Venous V Waves Present] : normal jugular venous V waves present [No Jugular Venous Ash A Waves] : no jugular venous ash A waves [Respiration, Rhythm And Depth] : normal respiratory rhythm and effort [Exaggerated Use Of Accessory Muscles For Inspiration] : no accessory muscle use [Auscultation Breath Sounds / Voice Sounds] : lungs were clear to auscultation bilaterally [Heart Rate And Rhythm] : heart rate and rhythm were normal [Heart Sounds] : normal S1 and S2 [Murmurs] : no murmurs present [Abdomen Soft] : soft [Abdomen Tenderness] : non-tender [Abdomen Mass (___ Cm)] : no abdominal mass palpated [Abnormal Walk] : normal gait [Gait - Sufficient For Exercise Testing] : the gait was sufficient for exercise testing [Nail Clubbing] : no clubbing of the fingernails [Cyanosis, Localized] : no localized cyanosis [Petechial Hemorrhages (___cm)] : no petechial hemorrhages [Skin Color & Pigmentation] : normal skin color and pigmentation [] : no rash [No Venous Stasis] : no venous stasis [Skin Lesions] : no skin lesions [No Skin Ulcers] : no skin ulcer [No Xanthoma] : no  xanthoma was observed [Oriented To Time, Place, And Person] : oriented to person, place, and time [Affect] : the affect was normal [Mood] : the mood was normal [No Anxiety] : not feeling anxious [FreeTextEntry1] : MEDIAN STERNOTOMY SCAR.

## 2020-11-11 NOTE — REASON FOR VISIT
[FreeTextEntry1] : The patient is followed with the principal diagnosis of coronary artery disease status post coronary bypass surgery, diabetes mellitus, paroxysmal atrial fibrillation.B. bypass procedure was performed on December 31, 2013. The patient has been free of anginal symptoms and coronary ischemia since that time. He has suffered with an unsteady gait most likely due to lower extremity peripheral neuropathy.

## 2020-11-13 ENCOUNTER — APPOINTMENT (OUTPATIENT)
Dept: NEPHROLOGY | Facility: CLINIC | Age: 85
End: 2020-11-13
Payer: MEDICARE

## 2020-11-13 ENCOUNTER — RESULT REVIEW (OUTPATIENT)
Age: 85
End: 2020-11-13

## 2020-11-13 VITALS — SYSTOLIC BLOOD PRESSURE: 154 MMHG | DIASTOLIC BLOOD PRESSURE: 60 MMHG | HEART RATE: 62 BPM

## 2020-11-13 PROCEDURE — 36415 COLL VENOUS BLD VENIPUNCTURE: CPT

## 2020-11-13 PROCEDURE — 93784 AMBL BP MNTR W/SOFTWARE: CPT

## 2020-11-17 ENCOUNTER — APPOINTMENT (OUTPATIENT)
Dept: GERIATRICS | Facility: CLINIC | Age: 85
End: 2020-11-17
Payer: MEDICARE

## 2020-11-17 VITALS
SYSTOLIC BLOOD PRESSURE: 148 MMHG | BODY MASS INDEX: 28.35 KG/M2 | DIASTOLIC BLOOD PRESSURE: 60 MMHG | OXYGEN SATURATION: 96 % | HEART RATE: 73 BPM | TEMPERATURE: 97.4 F | WEIGHT: 181 LBS

## 2020-11-17 PROCEDURE — G0439: CPT

## 2020-11-17 PROCEDURE — 99213 OFFICE O/P EST LOW 20 MIN: CPT | Mod: 25

## 2020-11-17 RX ORDER — CHLORTHALIDONE 25 MG/1
25 TABLET ORAL
Qty: 90 | Refills: 3 | Status: COMPLETED | COMMUNITY
Start: 2019-10-29 | End: 2020-11-17

## 2020-11-17 NOTE — REVIEW OF SYSTEMS
[Hoarseness] : hoarseness [Fever] : no fever [Chills] : no chills [Red Eyes] : eyes not red [Discharge From Eyes] : no purulent discharge from the eyes [Nosebleeds] : no nosebleeds [Nasal Discharge] : no nasal discharge [Sore Throat] : no sore throat [Chest Pain] : no chest pain [Palpitations] : no palpitations [Shortness Of Breath] : no shortness of breath [Wheezing] : no wheezing [Cough] : no cough [Abdominal Pain] : no abdominal pain [Vomiting] : no vomiting [Genital Lesion] : no genital lesions [Testicular Pain] : no testicular pain [Limb Pain] : no limb pain [Limb Swelling] : no limb swelling [Skin Lesions] : no skin lesions [Skin Wound] : no skin wound [Dizziness] : no dizziness [Fainting] : no fainting [Anxiety] : no anxiety [Depression] : no depression

## 2020-11-17 NOTE — HISTORY OF PRESENT ILLNESS
[4] : 4 [Compliant with medications] : compliant with medications [Fully Independent] : fully independent [Drives without concerns] : drives without concerns [No history of falls] : no history of falls [Seatbelts] : seatbelts [Safe Driving Habits] : safe driving habits [Smoke Detectors] : smoke detectors [Carbon Monoxide Detector] : carbon monoxide detector [PMH Reviewed and Updated] : past medical history reviewed and updated [PSH Reviewed and Updated] : past surgical history reviewed and updated [Family History Reviewed and Updated] : family history reviewed and updated [Medication and Allergies Reconciled] : medication and allergies reconciled [Spouse] : spouse [Retired] : retired from work [Over the Past 2 Weeks, Have You Felt Down, Depressed, or Hopeless?] : 1.) Over the past 2 weeks, have you felt down, depressed, or hopeless? No [Over the Past 2 Weeks, Have You Felt Little Interest or Pleasure Doing Things?] : 2.) Over the past 2 weeks, have you felt little interest or pleasure doing things? No [FreeTextEntry1] : have stopped losartan\par mantained on chlorthalidone Mon-Friday, skipping saturdays\par recent K+ check was unchaged at 5.4\par BP remains stable but plan is to consider amlodipine 5mg if BP rises\par \par few weeks of R sided gluteal pain espcially if seated more than 5 mins\par using Tylenol\par walking helps\par no radiation of pain, able to sleep fine\par no neuropathic symptoms \par no back pain\par \par

## 2020-11-17 NOTE — PHYSICAL EXAM
[General Appearance - Alert] : alert [General Appearance - In No Acute Distress] : in no acute distress [General Appearance - Well Nourished] : well nourished [General Appearance - Well Developed] : well developed [Sclera] : the sclera and conjunctiva were normal [Extraocular Movements] : extraocular movements were intact [Normal Oral Mucosa] : normal oral mucosa [No Oral Pallor] : no oral pallor [Neck Appearance] : the appearance of the neck was normal [Respiration, Rhythm And Depth] : normal respiratory rhythm and effort [Exaggerated Use Of Accessory Muscles For Inspiration] : no accessory muscle use [Bowel Sounds] : normal bowel sounds [Abdomen Soft] : soft [Abdomen Tenderness] : non-tender [Cervical Lymph Nodes Enlarged Posterior Bilaterally] : posterior cervical [Cervical Lymph Nodes Enlarged Anterior Bilaterally] : anterior cervical [Supraclavicular Lymph Nodes Enlarged Bilaterally] : supraclavicular [No CVA Tenderness] : no ~M costovertebral angle tenderness [Abnormal Walk] : normal gait [Skin Color & Pigmentation] : normal skin color and pigmentation [] : no rash [No Focal Deficits] : no focal deficits [Oriented To Time, Place, And Person] : oriented to person, place, and time [Impaired Insight] : insight and judgment were intact [Affect] : the affect was normal [Mood] : the mood was normal [No Rectal Mass] : no rectal mass [External Hemorrhoid] : external hemorrhoids [FreeTextEntry1] : gluteal muscle R spasm noted [Prostate Tenderness] : the prostate was not tender

## 2020-11-25 ENCOUNTER — RESULT REVIEW (OUTPATIENT)
Age: 85
End: 2020-11-25

## 2020-12-04 LAB
ALBUMIN SERPL ELPH-MCNC: 4.5 G/DL
ALP BLD-CCNC: 49 U/L
ALT SERPL-CCNC: 15 U/L
ANION GAP SERPL CALC-SCNC: 12 MMOL/L
AST SERPL-CCNC: 15 U/L
BILIRUB SERPL-MCNC: 0.2 MG/DL
BUN SERPL-MCNC: 28 MG/DL
CALCIUM SERPL-MCNC: 9.7 MG/DL
CHLORIDE SERPL-SCNC: 103 MMOL/L
CHOLEST SERPL-MCNC: 116 MG/DL
CO2 SERPL-SCNC: 28 MMOL/L
CREAT SERPL-MCNC: 1.48 MG/DL
CREAT SPEC-SCNC: 79 MG/DL
ESTIMATED AVERAGE GLUCOSE: 126 MG/DL
GLUCOSE SERPL-MCNC: 118 MG/DL
HBA1C MFR BLD HPLC: 6 %
HDLC SERPL-MCNC: 32 MG/DL
LDLC SERPL CALC-MCNC: 51 MG/DL
MICROALBUMIN 24H UR DL<=1MG/L-MCNC: 4.4 MG/DL
MICROALBUMIN/CREAT 24H UR-RTO: 56 MG/G
NONHDLC SERPL-MCNC: 84 MG/DL
POTASSIUM SERPL-SCNC: 5.4 MMOL/L
PROT SERPL-MCNC: 7 G/DL
SODIUM SERPL-SCNC: 143 MMOL/L
TRIGL SERPL-MCNC: 163 MG/DL

## 2020-12-09 ENCOUNTER — RX RENEWAL (OUTPATIENT)
Age: 85
End: 2020-12-09

## 2020-12-14 ENCOUNTER — APPOINTMENT (OUTPATIENT)
Dept: ENDOCRINOLOGY | Facility: CLINIC | Age: 85
End: 2020-12-14
Payer: MEDICARE

## 2020-12-14 ENCOUNTER — RESULT CHARGE (OUTPATIENT)
Age: 85
End: 2020-12-14

## 2020-12-14 VITALS
DIASTOLIC BLOOD PRESSURE: 80 MMHG | OXYGEN SATURATION: 97 % | WEIGHT: 181 LBS | SYSTOLIC BLOOD PRESSURE: 124 MMHG | HEART RATE: 76 BPM | HEIGHT: 67 IN | BODY MASS INDEX: 28.41 KG/M2 | TEMPERATURE: 98.3 F

## 2020-12-14 LAB — GLUCOSE BLDC GLUCOMTR-MCNC: 132

## 2020-12-14 PROCEDURE — 99214 OFFICE O/P EST MOD 30 MIN: CPT | Mod: 25

## 2020-12-14 PROCEDURE — 82962 GLUCOSE BLOOD TEST: CPT

## 2020-12-14 NOTE — HISTORY OF PRESENT ILLNESS
[FreeTextEntry1] :   87 yo WM coming for f/u DM2, seen Medina Willson \par        Follow up for TDM2 with micro and macrovascular complications including CABG 2013, HTN, HLD, Nephropathy and neuropathy. \par        Dx 10 years ago. \par        Hga1c down to 6.0 was up to 7.3, now firher up to 7.6 \par        Regimen: \par        Metformin ER  to 500mg PO Qam and 500mg 2tab  QPM no diarrhea\par        off Januvia 100mg PO at Lunch stopped 8/2018\par        Glipizide ER 10mg ER PO qam and 5mg qpm\par        Trulicity 1.5mg SC weekly denies side effects \par        Tolerating medications well with no SE. \par        Fell at home Fell on to coffee table. Fractured cervical spine. 3 months ago, lost weight then gained back\par        Checks BG levels twice daily from BS log :\par        Sporadically throughout the day\par      fasting \par        has    after meals  once 224\par        Microvascular complications: \par        Nephropathy denies Cr 1.4 eGFR 42. Sees Dr. Cochran. decreased Clorthalidone twice a week, stopped Lisinopril with very good BP but lower EGFR \par        Neuropathy symptoms, yes\par        microfilament exam + neuropathy.\par        Retinopathy sees Dr. Fierro. Had Elyea Left no eye Injection has macular degeneration Visit Q6-8 weeks. \par        No recent loss of vision or blurry vision. \par        Macrovascular complications: \par        HTN improved. Sees Dr. Chopra. Toprolol ER 25mg PO BID, Clorthalidone 25mg PO Twice weekly. Echo and stress next week. Diovan dcd and now on Losartan 100mg PO Qdaily\par        CAD/CVA yes CABG 2013/Afib. Off Elaquis since the fall on 81mg PO Qdaily\par        Lipids , LDL 58, HDL 28. Lovaza 2g bid, Lipitor 40mg qpm\par        Vitamin B12 liquid once weekly \par        Vitamin D3 1000iu tab Twice daily\par        Iron 325mg PO Qdaily\par        TSH 1.97.

## 2020-12-21 PROBLEM — J06.9 URI, ACUTE: Status: RESOLVED | Noted: 2019-05-21 | Resolved: 2020-12-21

## 2021-01-19 ENCOUNTER — RX RENEWAL (OUTPATIENT)
Age: 86
End: 2021-01-19

## 2021-02-11 ENCOUNTER — RX RENEWAL (OUTPATIENT)
Age: 86
End: 2021-02-11

## 2021-02-16 ENCOUNTER — RX RENEWAL (OUTPATIENT)
Age: 86
End: 2021-02-16

## 2021-02-16 RX ORDER — LOSARTAN POTASSIUM 25 MG/1
25 TABLET, FILM COATED ORAL
Qty: 90 | Refills: 1 | Status: DISCONTINUED | COMMUNITY
Start: 2020-10-16 | End: 2021-02-16

## 2021-03-17 ENCOUNTER — APPOINTMENT (OUTPATIENT)
Dept: GERIATRICS | Facility: CLINIC | Age: 86
End: 2021-03-17
Payer: MEDICARE

## 2021-03-17 VITALS
BODY MASS INDEX: 28.82 KG/M2 | HEART RATE: 78 BPM | OXYGEN SATURATION: 97 % | SYSTOLIC BLOOD PRESSURE: 156 MMHG | WEIGHT: 184 LBS | DIASTOLIC BLOOD PRESSURE: 70 MMHG | TEMPERATURE: 98 F

## 2021-03-17 VITALS — DIASTOLIC BLOOD PRESSURE: 70 MMHG | SYSTOLIC BLOOD PRESSURE: 130 MMHG

## 2021-03-17 DIAGNOSIS — H91.93 UNSPECIFIED HEARING LOSS, BILATERAL: ICD-10-CM

## 2021-03-17 PROCEDURE — 99214 OFFICE O/P EST MOD 30 MIN: CPT

## 2021-03-17 NOTE — ASSESSMENT
[FreeTextEntry1] : off of losartan\par K+ stable but has not normalized\par likely minimizing chlorthalidone to protect kidney function\par labs reviewed A1C rising\par \par this summer consider shingrix\par \par \par \par

## 2021-03-17 NOTE — HISTORY OF PRESENT ILLNESS
[0] : 2) Feeling down, depressed, or hopeless: Not at all [PHQ-2 Score ___] : PHQ-2 Score [unfilled] [FreeTextEntry1] : have stopped losartan\par maintained on chlorthalidone Mon-Friday, skipping Sat & Sun\par recent K+ check slightly elevated but greatly improving\par BP remains stable but plan is to consider amlodipine 5mg if BP rises\par \par otherwise feels well\par A1C rising on recent labs\par

## 2021-03-17 NOTE — REVIEW OF SYSTEMS
[Fever] : no fever [Chills] : no chills [Red Eyes] : eyes not red [Discharge From Eyes] : no purulent discharge from the eyes [Nosebleeds] : no nosebleeds [Nasal Discharge] : no nasal discharge [Sore Throat] : no sore throat [Hoarseness] : hoarseness [Chest Pain] : no chest pain [Palpitations] : no palpitations [Shortness Of Breath] : no shortness of breath [Wheezing] : no wheezing [Cough] : no cough [Abdominal Pain] : no abdominal pain [Vomiting] : no vomiting [Genital Lesion] : no genital lesions [Testicular Pain] : no testicular pain [Limb Pain] : no limb pain [Limb Swelling] : no limb swelling [Skin Lesions] : no skin lesions [Skin Wound] : no skin wound [Dizziness] : no dizziness [Fainting] : no fainting [Anxiety] : no anxiety [Depression] : no depression

## 2021-03-17 NOTE — SOCIAL HISTORY
[No falls in past year] : Patient reported no falls in the past year [Fully functional (bathing, dressing, toileting, transferring, walking, feeding)] : Fully functional (bathing, dressing, toileting, transferring, walking, feeding) [Fully functional (using the telephone, shopping, preparing meals, housekeeping, doing laundry, using transportation,] : Fully functional and needs no help or supervision to perform IADLs (using the telephone, shopping, preparing meals, housekeeping, doing laundry, using transportation, managing medications and managing finances) [Smoke Detector] : smoke detector [Carbon Monoxide Detector] : carbon monoxide detector [Safety elements used in home] : safety elements used in home [Grab Bars] : grab bars [Shower Chair] : shower chair [Night Light] : night light [Anti-Slip Measures] : anti-slip measures [Seat Belt] :  uses seat belt [Driving] : driving

## 2021-03-17 NOTE — PHYSICAL EXAM
[General Appearance - Alert] : alert [General Appearance - In No Acute Distress] : in no acute distress [General Appearance - Well Nourished] : well nourished [General Appearance - Well Developed] : well developed [Sclera] : the sclera and conjunctiva were normal [Extraocular Movements] : extraocular movements were intact [Normal Oral Mucosa] : normal oral mucosa [No Oral Pallor] : no oral pallor [Neck Appearance] : the appearance of the neck was normal [Respiration, Rhythm And Depth] : normal respiratory rhythm and effort [Exaggerated Use Of Accessory Muscles For Inspiration] : no accessory muscle use [Bowel Sounds] : normal bowel sounds [Abdomen Soft] : soft [Abdomen Tenderness] : non-tender [Prostate Tenderness] : the prostate was not tender [Cervical Lymph Nodes Enlarged Posterior Bilaterally] : posterior cervical [Cervical Lymph Nodes Enlarged Anterior Bilaterally] : anterior cervical [Supraclavicular Lymph Nodes Enlarged Bilaterally] : supraclavicular [No CVA Tenderness] : no ~M costovertebral angle tenderness [Abnormal Walk] : normal gait [Skin Color & Pigmentation] : normal skin color and pigmentation [] : no rash [No Focal Deficits] : no focal deficits [Oriented To Time, Place, And Person] : oriented to person, place, and time [Impaired Insight] : insight and judgment were intact [Affect] : the affect was normal [Mood] : the mood was normal

## 2021-03-22 ENCOUNTER — APPOINTMENT (OUTPATIENT)
Dept: ENDOCRINOLOGY | Facility: CLINIC | Age: 86
End: 2021-03-22
Payer: MEDICARE

## 2021-03-22 ENCOUNTER — RESULT CHARGE (OUTPATIENT)
Age: 86
End: 2021-03-22

## 2021-03-22 VITALS
BODY MASS INDEX: 28.88 KG/M2 | SYSTOLIC BLOOD PRESSURE: 122 MMHG | TEMPERATURE: 98.3 F | OXYGEN SATURATION: 98 % | HEIGHT: 67 IN | WEIGHT: 184 LBS | HEART RATE: 70 BPM | DIASTOLIC BLOOD PRESSURE: 64 MMHG

## 2021-03-22 LAB — GLUCOSE BLDC GLUCOMTR-MCNC: 181

## 2021-03-22 PROCEDURE — 99214 OFFICE O/P EST MOD 30 MIN: CPT

## 2021-03-22 NOTE — HISTORY OF PRESENT ILLNESS
[FreeTextEntry1] :   87 yo WM coming for f/u DM2, seen Medina Willson about a year ago , but still has some pancakes , ice-cream etc \par        Follow up for TDM2 with micro and macrovascular complications including CABG 2013, HTN, HLD, Nephropathy and neuropathy. \par        Dx 10 years ago. \par        Hga1c down to 6.0 was up to 7.3, now firher up to 7.6 \par        Regimen: \par        Metformin ER  to 500mg PO Qam and 500mg 2tab  QPM no diarrhea\par        off Januvia 100mg PO at Lunch stopped 8/2018\par        Glipizide ER 10mg ER PO qam and 5mg qpm\par        Trulicity 1.5mg SC weekly denies side effects \par        Tolerating medications well with no SE. \par        Fell at home Fell on to coffee table. Fractured cervical spine. 3 months ago, lost weight then gained back\par        Checks BG levels twice daily from BS log :\par        Sporadically throughout the day\par      fasting \par        has    after meals  once 224\par        Microvascular complications: \par        Nephropathy denies Cr 1.4 eGFR 42. Sees Dr. Cochran. decreased Clorthalidone twice a week, stopped Lisinopril with very good BP but lower EGFR \par        Neuropathy symptoms, yes\par        microfilament exam + neuropathy.\par        Retinopathy sees Dr. Fierro. Had Elyea Left no eye Injection has macular degeneration Visit Q6-8 weeks. per pt had some "beginning of retinopathy" will request the note for review  \par        No recent loss of vision or blurry vision. \par        Macrovascular complications: \par        HTN improved. Sees Dr. Chopra. Toprolol ER 25mg PO BID, Clorthalidone 25mg PO Twice weekly. Echo and stress next week. Diovan dcd and now on Losartan 100mg PO Qdaily\par        CAD/CVA yes CABG 2013/Afib. Off Elaquis since the fall on 81mg PO Qdaily\par        Lipids , LDL 58, HDL 28. Lovaza 2g bid, Lipitor 40mg qpm\par        Vitamin B12 liquid once weekly \par        Vitamin D3 1000iu tab Twice daily\par        Iron 325mg PO Qdaily\par        TSH 1.97.

## 2021-03-24 ENCOUNTER — APPOINTMENT (OUTPATIENT)
Dept: NEPHROLOGY | Facility: CLINIC | Age: 86
End: 2021-03-24
Payer: MEDICARE

## 2021-03-24 VITALS
OXYGEN SATURATION: 99 % | BODY MASS INDEX: 28.25 KG/M2 | DIASTOLIC BLOOD PRESSURE: 70 MMHG | SYSTOLIC BLOOD PRESSURE: 124 MMHG | TEMPERATURE: 97.9 F | HEART RATE: 68 BPM | HEIGHT: 67 IN | WEIGHT: 180 LBS

## 2021-03-24 PROCEDURE — 99212 OFFICE O/P EST SF 10 MIN: CPT

## 2021-04-02 NOTE — HISTORY OF PRESENT ILLNESS
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 review of chart shows creatinine 1.4 in 2013.\par Seems to fluctuate between 1.1 to 1.4 since 2013 - few elevated readings of 1.6, likely related to hydration\par States he was hospitalized in 7/2017 with SBP > 230.        \par Of note has vein harvested on L leg, so w/o diuretic exp edema -.

## 2021-05-25 ENCOUNTER — NON-APPOINTMENT (OUTPATIENT)
Age: 86
End: 2021-05-25

## 2021-05-25 ENCOUNTER — APPOINTMENT (OUTPATIENT)
Dept: CARDIOLOGY | Facility: CLINIC | Age: 86
End: 2021-05-25
Payer: MEDICARE

## 2021-05-25 VITALS
HEART RATE: 68 BPM | WEIGHT: 179 LBS | RESPIRATION RATE: 18 BRPM | HEIGHT: 67 IN | SYSTOLIC BLOOD PRESSURE: 142 MMHG | OXYGEN SATURATION: 99 % | TEMPERATURE: 98.6 F | BODY MASS INDEX: 28.09 KG/M2 | DIASTOLIC BLOOD PRESSURE: 74 MMHG

## 2021-05-25 PROCEDURE — 99214 OFFICE O/P EST MOD 30 MIN: CPT

## 2021-05-25 PROCEDURE — 93000 ELECTROCARDIOGRAM COMPLETE: CPT

## 2021-05-25 RX ORDER — MECLIZINE HYDROCHLORIDE 25 MG/1
25 TABLET ORAL 4 TIMES DAILY
Refills: 0 | Status: DISCONTINUED | COMMUNITY
End: 2020-05-25

## 2021-05-25 NOTE — REASON FOR VISIT
[FreeTextEntry1] : Patient is followed with a principal diagnosis of coronary artery disease status post coronary bypass, episode of atrial fibrillation. His general condition has been entirely stable. However he has noticed a sharp left pectoral discomfort which tends to occur after having dinner and walking back to his apartment. He has remedied this both with nitroglycerin and a few minutes later with TUMS and the symptom tends to resolve after just a couple of minutes. There is no shortness of breath no palpitations no dizziness or lightheadedness. The patient is able to exercise vigorously for up to 45 minutes with no discomfort at all.

## 2021-05-25 NOTE — DISCUSSION/SUMMARY
[FreeTextEntry1] : The patient's general examination is stable. He describes symptoms of left pectoral discomfort which is somewhat atypical. They are sharp in quality and occur after meals and have not occurred during vigorous exercise. However given the history of coronary disease we will further evaluate this with treadmill stress testing. The patient's current medications will be continued. I have asked him to call me if there is any increase in frequency or severity of the symptoms. Today's electrocardiogram reveals sinus rhythm left anterior hemiblock and right bundle branch block with no acute changes.\par The patient advises me that  his diabetes has been under good control.\par The patient is on a number of glucose lowering agents. His most recent addition was TRULICITY.\par The patient had previously been on JANUVIA.\par Given the numerous studies on the benefits of the sodium glucose transporter inhibitors including cardiovascular benefit as well as renal protection I would wonder if the patient is a candidate to go back on this class of medications.\par I'm planning to do stress testing at the time of the next visit.

## 2021-05-27 ENCOUNTER — NON-APPOINTMENT (OUTPATIENT)
Age: 86
End: 2021-05-27

## 2021-07-06 ENCOUNTER — APPOINTMENT (OUTPATIENT)
Dept: CARDIOLOGY | Facility: CLINIC | Age: 86
End: 2021-07-06
Payer: MEDICARE

## 2021-07-06 VITALS
SYSTOLIC BLOOD PRESSURE: 138 MMHG | HEART RATE: 65 BPM | HEIGHT: 68 IN | WEIGHT: 180 LBS | DIASTOLIC BLOOD PRESSURE: 70 MMHG | BODY MASS INDEX: 27.28 KG/M2 | TEMPERATURE: 97.7 F

## 2021-07-06 PROCEDURE — 93351 STRESS TTE COMPLETE: CPT

## 2021-07-06 PROCEDURE — 99213 OFFICE O/P EST LOW 20 MIN: CPT | Mod: 25

## 2021-07-06 NOTE — REASON FOR VISIT
[FreeTextEntry1] : The patient is followed with the principal diagnosis of coronary artery disease status post coronary bypass. In 2014.\par During the past several months the patient has developed an anginal syndrome. this consists of the left upper chest discomfort sometimes precipitated by exertion and occasionally occurring at rest.The symptom is relieved immediately by sublingual nitroglycerin. Medications the patient is able to conduct his usual exercise without symptoms.Has been no shortness of breath dizziness or palpitations.

## 2021-07-06 NOTE — DISCUSSION/SUMMARY
[FreeTextEntry1] : The patient has developed a definite anginal syndrome over the past several months. The stress echo which I performed today did precipitate some left-sided anginal discomfort however the echo images revealed no overt evidence of exercise-induced myocardial ischemia. I am quite certain that there has been some progression of disease since the patient underwent successful bypass surgery in 2014. My plan is to try to control he anginal symptoms with additional medication. We'll prescribe isosorbide mononitrate 30 mg daily to start and I have asked the patient to keep me advised of his progress; the other medications will be continued.

## 2021-07-06 NOTE — REVIEW OF SYSTEMS
[Chest Discomfort] : chest discomfort [Negative] : Heme/Lymph [FreeTextEntry5] : see present illness.

## 2021-07-14 ENCOUNTER — APPOINTMENT (OUTPATIENT)
Dept: GERIATRICS | Facility: CLINIC | Age: 86
End: 2021-07-14
Payer: MEDICARE

## 2021-07-14 VITALS
BODY MASS INDEX: 27.37 KG/M2 | DIASTOLIC BLOOD PRESSURE: 60 MMHG | TEMPERATURE: 97.9 F | OXYGEN SATURATION: 100 % | WEIGHT: 180 LBS | HEART RATE: 88 BPM | SYSTOLIC BLOOD PRESSURE: 114 MMHG

## 2021-07-14 PROCEDURE — 99214 OFFICE O/P EST MOD 30 MIN: CPT

## 2021-07-14 NOTE — REVIEW OF SYSTEMS
Med      losartan-hydrochlorothiazide (HYZAAR) 50-12.5 MG per tablet Take 2 tablets by mouth daily       ferrous sulfate 325 (65 FE) MG tablet Take 1 tablet by mouth daily. , Disp-30 tablet, R-0      omeprazole (PRILOSEC) 20 MG capsule Take 20 mg by mouth nightly Historical Med      pravastatin (PRAVACHOL) 40 MG tablet Take 80 mg by mouth nightly Historical Med      ASPIRIN Take 81 mg by mouth daily. ALLERGIES     Codeine and Morphine and related    FAMILYHISTORY       Family History   Problem Relation Age of Onset    Heart Disease Mother           SOCIAL HISTORY       Social History     Social History    Marital status:      Spouse name: N/A    Number of children: N/A    Years of education: N/A     Social History Main Topics    Smoking status: Former Smoker     Packs/day: 1.00     Years: 15.00     Quit date: 4/21/1998    Smokeless tobacco: Never Used    Alcohol use No    Drug use: No    Sexual activity: Not Currently     Other Topics Concern    None     Social History Narrative    None       SCREENINGS    [place] Coma Scale  Eye Opening: Spontaneous  Best Verbal Response: Oriented  Best Motor Response: Obeys commands  [place] Coma Scale Score: 15        PHYSICAL EXAM    (up to 7 for level 4, 8 or more for level 5)     ED Triage Vitals [12/21/18 1458]   BP Temp Temp Source Pulse Resp SpO2 Height Weight   (!) 147/65 98.2 °F (36.8 °C) Oral 80 20 (!) 77 % -- 150 lb (68 kg)       Physical Exam   Constitutional: She is oriented to person, place, and time. She appears well-developed and well-nourished. No distress. HENT:   Head: Normocephalic and atraumatic. Right Ear: External ear normal.   Left Ear: External ear normal.   Nose: Nose normal.   Mouth/Throat: Oropharynx is clear and moist. No oropharyngeal exudate. Eyes: Pupils are equal, round, and reactive to light. Conjunctivae are normal.   Neck: Normal range of motion. Neck supple.    Cardiovascular: Normal rate, regular rhythm, nitrofurantoin <=16 Sensitive mcg/mL     piperacillin-tazobactam <=2/4 Sensitive mcg/mL     tetracycline <=2 Sensitive mcg/mL     trimethoprim-sulfamethoxazole <=0.5/9.5 Sensitive mcg/mL   Lactate, Sepsis   Result Value Ref Range    Lactic Acid, Sepsis 1.1 0.4 - 1.9 mmol/L   CBC auto differential   Result Value Ref Range    WBC 7.7 4.0 - 11.0 K/uL    RBC 3.91 (L) 4.00 - 5.20 M/uL    Hemoglobin 11.7 (L) 12.0 - 16.0 g/dL    Hematocrit 35.3 (L) 36.0 - 48.0 %    MCV 90.4 80.0 - 100.0 fL    MCH 30.0 26.0 - 34.0 pg    MCHC 33.2 31.0 - 36.0 g/dL    RDW 12.9 12.4 - 15.4 %    Platelets 786 444 - 779 K/uL    MPV 8.0 5.0 - 10.5 fL    Neutrophils % 84.0 %    Lymphocytes % 5.0 %    Monocytes % 1.0 %    Eosinophils % 1.0 %    Basophils % 2.0 %    Neutrophils # 7.0 1.7 - 7.7 K/uL    Lymphocytes # 0.4 (L) 1.0 - 5.1 K/uL    Monocytes # 0.1 0.0 - 1.3 K/uL    Eosinophils # 0.1 0.0 - 0.6 K/uL    Basophils # 0.2 0.0 - 0.2 K/uL    Bands Relative 6 0 - 7 %    Metamyelocytes Relative 1 (A) %    nRBC 1 (A) /100 WBC    Anisocytosis 1+ (A)     Polychromasia Occasional (A)    Comprehensive Metabolic Panel w/ Reflex to MG   Result Value Ref Range    Sodium 133 (L) 136 - 145 mmol/L    Potassium reflex Magnesium 4.5 3.5 - 5.1 mmol/L    Chloride 89 (L) 99 - 110 mmol/L    CO2 34 (H) 21 - 32 mmol/L    Anion Gap 10 3 - 16    Glucose 247 (H) 70 - 99 mg/dL    BUN 16 7 - 20 mg/dL    CREATININE 0.8 0.6 - 1.2 mg/dL    GFR Non-African American >60 >60    GFR African American >60 >60    Calcium 9.9 8.3 - 10.6 mg/dL    Total Protein 7.7 6.4 - 8.2 g/dL    Alb 3.8 3.4 - 5.0 g/dL    Albumin/Globulin Ratio 1.0 (L) 1.1 - 2.2    Total Bilirubin 0.6 0.0 - 1.0 mg/dL    Alkaline Phosphatase 61 40 - 129 U/L    ALT 10 10 - 40 U/L    AST 17 15 - 37 U/L    Globulin 3.9 g/dL   Blood gas, arterial   Result Value Ref Range    pH, Arterial 7.395 7.350 - 7.450    pCO2, Arterial 57.2 (H) 35.0 - 45.0 mmHg    pO2, Arterial 125.7 (H) 75.0 - 108.0 mmHg    HCO3, Arterial 34.2 (H) [Fever] : no fever [Chills] : no chills [Red Eyes] : eyes not red [Discharge From Eyes] : no purulent discharge from the eyes [Nosebleeds] : no nosebleeds [Nasal Discharge] : no nasal discharge [Sore Throat] : no sore throat [Hoarseness] : hoarseness [Chest Pain] : no chest pain [Palpitations] : no palpitations [Shortness Of Breath] : no shortness of breath [Wheezing] : no wheezing [Cough] : no cough [Abdominal Pain] : no abdominal pain [Vomiting] : no vomiting [Genital Lesion] : no genital lesions [Testicular Pain] : no testicular pain [Limb Pain] : no limb pain [Limb Swelling] : no limb swelling [Skin Lesions] : no skin lesions [Skin Wound] : no skin wound [Dizziness] : no dizziness [Fainting] : no fainting [Anxiety] : no anxiety [Depression] : no depression

## 2021-07-14 NOTE — HISTORY OF PRESENT ILLNESS
[No falls in past year] : Patient reported no falls in the past year [Fully functional (bathing, dressing, toileting, transferring, walking, feeding)] : Fully functional (bathing, dressing, toileting, transferring, walking, feeding) [Fully functional (using the telephone, shopping, preparing meals, housekeeping, doing laundry, using transportation,] : Fully functional and needs no help or supervision to perform IADLs (using the telephone, shopping, preparing meals, housekeeping, doing laundry, using transportation, managing medications and managing finances) [Canes] : patricia [Smoke Detector] : smoke detector [Carbon Monoxide Detector] : carbon monoxide detector [Safety elements used in home] : safety elements used in home [Grab Bars] : grab bars [Shower Chair] : shower chair [Night Light] : night light [Anti-Slip Measures] : anti-slip measures [Seat Belt] :  uses seat belt [Driving] : driving [0] : 2) Feeling down, depressed, or hopeless: Not at all [PHQ-2 Score ___] : PHQ-2 Score [unfilled] [FreeTextEntry1] : had stress echocardiogram - neg and started imdur\par worked for first week -no angina but last 7 nights\par has had angina and ATWOOD after dinner\par typically responds quickly to nitroglycerin\par no additional stress at home per patient \par \par reviewed recent labs

## 2021-07-14 NOTE — ASSESSMENT
[FreeTextEntry1] : reviewed labs - stable\par discussed that nightly nitroglycerin is not his baseline\par stress echo normal\par will reach out to cardiology to discuss options\par perhaps first attempting to increase imdur - if not effective role of ranexa\par \par \par \par \par \par

## 2021-07-19 ENCOUNTER — APPOINTMENT (OUTPATIENT)
Dept: ENDOCRINOLOGY | Facility: CLINIC | Age: 86
End: 2021-07-19
Payer: MEDICARE

## 2021-07-19 VITALS
SYSTOLIC BLOOD PRESSURE: 120 MMHG | WEIGHT: 181 LBS | DIASTOLIC BLOOD PRESSURE: 70 MMHG | BODY MASS INDEX: 27.43 KG/M2 | RESPIRATION RATE: 18 BRPM | OXYGEN SATURATION: 99 % | HEIGHT: 68 IN | HEART RATE: 60 BPM

## 2021-07-19 LAB — GLUCOSE BLDC GLUCOMTR-MCNC: 123

## 2021-07-19 PROCEDURE — 99215 OFFICE O/P EST HI 40 MIN: CPT | Mod: 25

## 2021-07-19 PROCEDURE — 82962 GLUCOSE BLOOD TEST: CPT

## 2021-07-20 NOTE — HISTORY OF PRESENT ILLNESS
[FreeTextEntry1] :   87 yo WM coming for f/u DM2, seen Medina Willson about a year ago , but still has some pancakes , ice-cream etc \par        Follow up for TDM2 with micro and macrovascular complications including CABG 2013, HTN, HLD, Nephropathy and neuropathy. \par        Dx 10 years ago. \par        Hga1c down to 6.0 was up to 7.3, now firher up to 7.6 \par        Regimen: \par        Metformin ER  to 500mg PO Qam and 500mg 1tab  QPM no diarrhea\par        off Januvia 100mg PO at Lunch stopped 8/2018\par        Glipizide ER 5mg ER PO qam and 5mg qpm\par        Trulicity 1.5mg SC weekly denies side effects \par        Tolerating medications well with no SE. \par        Fell at home Fell on to coffee table. Fractured cervical spine. 3 months ago, lost weight then gained back\par        Checks BG levels twice daily from BS log :\par        Sporadically throughout the day\par      fasting \par        has    after meals  once 224\par        Microvascular complications: \par        Nephropathy denies Cr 1.4 eGFR 42. Sees Dr. Cochran. decreased Clorthalidone twice a week, stopped Lisinopril with very good BP but lower EGFR \par        Neuropathy symptoms, yes\par        microfilament exam + neuropathy.\par        Retinopathy sees Dr. Fierro. Had Elyea Left no eye Injection has macular degeneration Visit Q6-8 weeks. per pt had some "beginning of retinopathy" will request the note for review  \par        No recent loss of vision or blurry vision. \par        Macrovascular complications: \par        HTN improved. Sees Dr. Chopra. Toprolol ER 25mg PO BID, Clorthalidone 25mg PO Twice weekly. Echo and stress next week. Diovan dcd and now on Losartan 100mg PO Qdaily\par        CAD/CVA yes CABG 2013/Afib. Off Elaquis since the fall on 81mg PO Qdaily\par        Lipids , LDL 58, HDL 28. Lovaza 2g bid, Lipitor 40mg qpm\par        Vitamin B12 liquid once weekly \par        Vitamin D3 1000iu tab Twice daily\par        Iron 325mg PO Qdaily\par        TSH 1.97.

## 2021-07-21 ENCOUNTER — APPOINTMENT (OUTPATIENT)
Dept: NEPHROLOGY | Facility: CLINIC | Age: 86
End: 2021-07-21
Payer: MEDICARE

## 2021-07-21 VITALS
OXYGEN SATURATION: 99 % | HEIGHT: 68 IN | BODY MASS INDEX: 26.98 KG/M2 | TEMPERATURE: 96.6 F | HEART RATE: 62 BPM | WEIGHT: 178 LBS | SYSTOLIC BLOOD PRESSURE: 114 MMHG | DIASTOLIC BLOOD PRESSURE: 50 MMHG

## 2021-07-21 PROCEDURE — 99214 OFFICE O/P EST MOD 30 MIN: CPT

## 2021-07-21 NOTE — ASSESSMENT
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

## 2021-07-21 NOTE — HISTORY OF PRESENT ILLNESS
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 review of chart shows creatinine 1.4 in 2013.\par Seems to fluctuate between 1.1 to 1.4 since 2013 - few elevated readings of 1.6, likely related to hydration\par States he was hospitalized in 7/2017 with SBP > 230.        \par Of note has vein harvested on L leg, so w/o diuretic exp edema -.

## 2021-07-28 RX ORDER — CANAGLIFLOZIN 300 MG/1
300 TABLET, FILM COATED ORAL DAILY
Qty: 90 | Refills: 1 | Status: DISCONTINUED | COMMUNITY
Start: 2021-07-21 | End: 2021-07-28

## 2021-08-26 ENCOUNTER — RX RENEWAL (OUTPATIENT)
Age: 86
End: 2021-08-26

## 2021-08-31 ENCOUNTER — APPOINTMENT (OUTPATIENT)
Dept: CARDIOLOGY | Facility: CLINIC | Age: 86
End: 2021-08-31
Payer: MEDICARE

## 2021-08-31 VITALS
HEIGHT: 68 IN | RESPIRATION RATE: 13 BRPM | HEART RATE: 78 BPM | OXYGEN SATURATION: 98 % | WEIGHT: 177 LBS | TEMPERATURE: 98.7 F | BODY MASS INDEX: 26.83 KG/M2 | SYSTOLIC BLOOD PRESSURE: 158 MMHG | DIASTOLIC BLOOD PRESSURE: 80 MMHG

## 2021-08-31 PROCEDURE — 93000 ELECTROCARDIOGRAM COMPLETE: CPT

## 2021-08-31 PROCEDURE — 99213 OFFICE O/P EST LOW 20 MIN: CPT

## 2021-08-31 NOTE — DISCUSSION/SUMMARY
[FreeTextEntry1] : The patient continues to experience exertional angina. To be sure there has been significant improvement with medications and it is promptly relieved by sublingual nitroglycerin. However the patient has limited his activities in order to avoid symptoms. I am therefore going to prescribe Ranexa 500 mg b.i.d. in addition to his current medications. I have asked the patient to report back to me with his progress in the next few weeks.\par Today's vital signs are stable and the electrocardiograph revealed sinus rhythm right bundle branch block left anterior fascicular block borderline first degree A-V block similar to previous tracings.

## 2021-08-31 NOTE — REVIEW OF SYSTEMS
[Chest Discomfort] : chest discomfort [Negative] : Heme/Lymph [FreeTextEntry5] : see present illness

## 2021-08-31 NOTE — REASON FOR VISIT
[FreeTextEntry1] : Was followed with a principal diagnosis of coronary artery disease status post coronary bypass surgery, renal disease, type 2 diabetes mellitus, and a history of an episode of atrial fibrillation. In recent months he has developed a syndrome of exertional angina. This has improved with medications however it is not completely resolved. The patient finds that he is limiting his activity in order to avoid the symptoms The patient's angina resolves within seconds either spontaneously or with the use of sublingual nitroglycerin.\par Angina has not occurred at rest.

## 2021-09-01 ENCOUNTER — NON-APPOINTMENT (OUTPATIENT)
Age: 86
End: 2021-09-01

## 2021-09-22 ENCOUNTER — RX RENEWAL (OUTPATIENT)
Age: 86
End: 2021-09-22

## 2021-10-20 ENCOUNTER — APPOINTMENT (OUTPATIENT)
Dept: NEPHROLOGY | Facility: CLINIC | Age: 86
End: 2021-10-20
Payer: MEDICARE

## 2021-10-20 VITALS
BODY MASS INDEX: 26.07 KG/M2 | HEART RATE: 74 BPM | TEMPERATURE: 97.9 F | OXYGEN SATURATION: 98 % | SYSTOLIC BLOOD PRESSURE: 110 MMHG | DIASTOLIC BLOOD PRESSURE: 50 MMHG | HEIGHT: 68 IN | WEIGHT: 172 LBS

## 2021-10-20 PROCEDURE — 99213 OFFICE O/P EST LOW 20 MIN: CPT

## 2021-10-20 NOTE — HISTORY OF PRESENT ILLNESS
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 review of chart shows creatinine 1.4 in 2013.\par Seems to fluctuate between 1.1 to 1.4 since 2013 - few elevated readings of 1.6, likely related to hydration\par States he was hospitalized in 7/2017 with SBP > 230.        \par Of note has vein harvested on L leg, so w/o diuretic exp edema -.

## 2021-10-20 NOTE — ASSESSMENT
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

## 2021-11-10 ENCOUNTER — APPOINTMENT (OUTPATIENT)
Dept: GERIATRICS | Facility: CLINIC | Age: 86
End: 2021-11-10
Payer: MEDICARE

## 2021-11-10 ENCOUNTER — RESULT REVIEW (OUTPATIENT)
Age: 86
End: 2021-11-10

## 2021-11-10 DIAGNOSIS — Z86.39 PERSONAL HISTORY OF OTHER ENDOCRINE, NUTRITIONAL AND METABOLIC DISEASE: ICD-10-CM

## 2021-11-10 PROCEDURE — 36415 COLL VENOUS BLD VENIPUNCTURE: CPT

## 2021-11-22 ENCOUNTER — APPOINTMENT (OUTPATIENT)
Dept: CARDIOLOGY | Facility: CLINIC | Age: 86
End: 2021-11-22
Payer: MEDICARE

## 2021-11-22 VITALS
TEMPERATURE: 97.9 F | SYSTOLIC BLOOD PRESSURE: 145 MMHG | BODY MASS INDEX: 26.52 KG/M2 | HEIGHT: 68 IN | WEIGHT: 175 LBS | DIASTOLIC BLOOD PRESSURE: 70 MMHG | HEART RATE: 65 BPM

## 2021-11-22 VITALS
WEIGHT: 175 LBS | BODY MASS INDEX: 26.52 KG/M2 | DIASTOLIC BLOOD PRESSURE: 70 MMHG | HEART RATE: 65 BPM | SYSTOLIC BLOOD PRESSURE: 145 MMHG | HEIGHT: 68 IN | TEMPERATURE: 98 F

## 2021-11-22 PROCEDURE — 93000 ELECTROCARDIOGRAM COMPLETE: CPT

## 2021-11-22 PROCEDURE — 99213 OFFICE O/P EST LOW 20 MIN: CPT

## 2021-11-22 NOTE — DISCUSSION/SUMMARY
[FreeTextEntry1] : The patient continues to make excellent clinical progress. The frequency of anginal episodes has declined very significantly with the current medical regimen. Cardiorespiratory examination today is stable the electrocardiogram reveals sinus rhythm right bundle branch block left anterior fascicular block with no acute change from previous tracings. Based on today's examination I believe the patient's clinical condition is improved. I plan to continue the current medications.\par A recent potassium level was elevated at 6.3. However this proved to be specious; likely related to hemolysis a repeat potassium was 5.1 The creatinine is approximately 2.0.

## 2021-11-23 ENCOUNTER — NON-APPOINTMENT (OUTPATIENT)
Age: 86
End: 2021-11-23

## 2021-12-06 ENCOUNTER — APPOINTMENT (OUTPATIENT)
Dept: ENDOCRINOLOGY | Facility: CLINIC | Age: 86
End: 2021-12-06
Payer: MEDICARE

## 2021-12-06 VITALS
BODY MASS INDEX: 26.52 KG/M2 | DIASTOLIC BLOOD PRESSURE: 68 MMHG | OXYGEN SATURATION: 100 % | SYSTOLIC BLOOD PRESSURE: 130 MMHG | HEIGHT: 68 IN | WEIGHT: 175 LBS | HEART RATE: 76 BPM

## 2021-12-06 LAB — Lab: 152

## 2021-12-06 PROCEDURE — 99215 OFFICE O/P EST HI 40 MIN: CPT

## 2021-12-06 PROCEDURE — 83036 HEMOGLOBIN GLYCOSYLATED A1C: CPT | Mod: QW

## 2021-12-06 RX ORDER — DAPAGLIFLOZIN 5 MG/1
5 TABLET, FILM COATED ORAL DAILY
Refills: 0 | Status: DISCONTINUED | COMMUNITY
End: 2021-12-06

## 2021-12-06 RX ORDER — GLIPIZIDE 5 MG/1
5 TABLET, FILM COATED, EXTENDED RELEASE ORAL
Qty: 90 | Refills: 1 | Status: DISCONTINUED | COMMUNITY
Start: 2020-05-20 | End: 2021-12-06

## 2021-12-06 NOTE — HISTORY OF PRESENT ILLNESS
[FreeTextEntry1] :   89 yo WM coming for f/u DM2, seen Medina Willson about a year ago , but still has some pancakes , ice-cream etc \par        Follow up for TDM2 with micro and macrovascular complications including CABG 2013, HTN, HLD, Nephropathy and neuropathy. \par        Dx 10 years ago. \par        Hga1c down to 6.0 was up to 7.3, now firher up to 7.6 \par        Regimen: \par        Metformin ER  to 500mg PO Qam and 500mg 1tab  QPM no diarrhea\par        off Januvia 100mg PO at Lunch stopped 8/2018\par        Glipizide ER 5mg ER PO qam and 2,5mg qpm\par Farxiga 10mg qam added by Dr Cochran \par        Trulicity 1.5mg SC weekly denies side effects \par        Tolerating medications well with no SE. \par        Fell at home Fell on to coffee table. Fractured cervical spine. 3 months ago, lost weight then gained back\par        Checks BG levels tqam fasting only \par        Sporadically throughout the day\par      fasting , once in September 52, no symtoms \par   \par        Microvascular complications: \par        Nephropathy denies Cr 1.4 eGFR 42. Sees Dr. Cochran. decreased Clorthalidone twice a week, stopped Lisinopril with very good BP but lower EGFR \par        Neuropathy symptoms, yes\par        microfilament exam + neuropathy.\par        Retinopathy sees Dr. Fierro. Had Elyea Left no eye Injection has macular degeneration Visit Q6-8 weeks. per pt had some "beginning of retinopathy" will request the note for review  \par        No recent loss of vision or blurry vision. \par        Macrovascular complications: \par        HTN improved. Sees Dr. Chopra. Toprolol ER 25mg PO BID, Clorthalidone 25mg PO Twice weekly. Echo and stress next week. Diovan dcd and now on Losartan 100mg PO Qdaily\par        CAD/CVA yes CABG 2013/Afib. Off Elaquis since the fall on 81mg PO Qdaily\par        Lipids , LDL 58, HDL 28. Lovaza 2g bid, Lipitor 40mg qpm\par        Vitamin B12 liquid once weekly \par        Vitamin D3 1000iu tab Twice daily\par        Iron 325mg PO Qdaily\par        TSH 1.97.

## 2021-12-23 ENCOUNTER — APPOINTMENT (OUTPATIENT)
Dept: GERIATRICS | Facility: CLINIC | Age: 86
End: 2021-12-23
Payer: MEDICARE

## 2021-12-23 VITALS
SYSTOLIC BLOOD PRESSURE: 140 MMHG | HEIGHT: 68 IN | DIASTOLIC BLOOD PRESSURE: 66 MMHG | WEIGHT: 178 LBS | HEART RATE: 71 BPM | OXYGEN SATURATION: 99 % | BODY MASS INDEX: 26.98 KG/M2 | TEMPERATURE: 97.9 F

## 2021-12-23 VITALS — SYSTOLIC BLOOD PRESSURE: 136 MMHG | DIASTOLIC BLOOD PRESSURE: 60 MMHG

## 2021-12-23 DIAGNOSIS — M79.18 MYALGIA, OTHER SITE: ICD-10-CM

## 2021-12-23 DIAGNOSIS — Z87.2 PERSONAL HISTORY OF DISEASES OF THE SKIN AND SUBCUTANEOUS TISSUE: ICD-10-CM

## 2021-12-23 DIAGNOSIS — Z12.5 ENCOUNTER FOR SCREENING FOR MALIGNANT NEOPLASM OF PROSTATE: ICD-10-CM

## 2021-12-23 PROCEDURE — G0439: CPT

## 2021-12-23 NOTE — HISTORY OF PRESENT ILLNESS
[Fully Independent] : fully independent [Drives without concerns] : drives without concerns [No history of falls] : no history of falls [Seatbelts] : seatbelts [Safe Driving Habits] : safe driving habits [Smoke Detectors] : smoke detectors [Hot Water Temp <120F] : hot water temp <120F [Bathroom Grab Bars] : bathroom grab bars [Fall Prevention Measures] : fall prevention measures [CPR Training for Household] : received CPR training for household [CPR Training for Patient] : received CPR training for patient [No falls in past year] : Patient reported no falls in the past year [Completely Independent] : Completely independent. [Independent] : managing finances [] : Assistance needed doing laundry [Cane] : cane [Smoke Detector] : smoke detector [Carbon Monoxide Detector] : carbon monoxide detector [Stair Lift] : stair lift used in home [Grab Bars] : grab bars [Shower Chair] : shower chair [Night Light] : night light [Anti-Slip Measures] : anti-slip measures [Wears Seat Belt] : wears seat belt [0] : 2) Feeling down, depressed, or hopeless: Not at all (0) [PMH Reviewed and Updated] : past medical history reviewed and updated [PSH Reviewed and Updated] : past surgical history reviewed and updated [Family History Reviewed and Updated] : family history reviewed and updated [Medication and Allergies Reconciled] : medication and allergies reconciled [Over the Past 2 Weeks, Have You Felt Down, Depressed, or Hopeless?] : 1.) Over the past 2 weeks, have you felt down, depressed, or hopeless? No [Over the Past 2 Weeks, Have You Felt Little Interest or Pleasure Doing Things?] : 2.) Over the past 2 weeks, have you felt little interest or pleasure doing things? No [Domestic Partner--Same Sex] : domestic partner of the same sex [Retired] : retired from work [Adequate] : adequate [Spouse] : spouse [Friends] : friends [Neighbors] : neighbors [FreeTextEntry1] : Feeling well\par Now on lower dose of glipizide\par Weight has come down over past year due to new diabetic regimen and working with endocrine\par had flu shot and booster at [facility]  [Driving Concerns] : not driving or driving without noted concerns [PHQ-2 Negative - No further assessment needed] : PHQ-2 Negative - No further assessment needed [[id number]] : 0

## 2021-12-23 NOTE — ASSESSMENT
[FreeTextEntry1] : up to date on all screenings\par will check PSA with next labs at [facility]\par remains on lower glipizide and has had weight lost in last year due to new diabetic regimen\par doing quite well and happy with progress\par \par MOLST signed showing no changes in decisions\par \par \par \par \par

## 2021-12-23 NOTE — PHYSICAL EXAM
[General Appearance - Alert] : alert [General Appearance - In No Acute Distress] : in no acute distress [General Appearance - Well Nourished] : well nourished [General Appearance - Well Developed] : well developed [Sclera] : the sclera and conjunctiva were normal [Extraocular Movements] : extraocular movements were intact [Normal Oral Mucosa] : normal oral mucosa [No Oral Pallor] : no oral pallor [Neck Appearance] : the appearance of the neck was normal [Respiration, Rhythm And Depth] : normal respiratory rhythm and effort [Exaggerated Use Of Accessory Muscles For Inspiration] : no accessory muscle use [Bowel Sounds] : normal bowel sounds [Abdomen Soft] : soft [Abdomen Tenderness] : non-tender [No Rectal Mass] : no rectal mass [External Hemorrhoid] : no external hemorrhoids [Prostate Tenderness] : the prostate was not tender [Cervical Lymph Nodes Enlarged Posterior Bilaterally] : posterior cervical [Cervical Lymph Nodes Enlarged Anterior Bilaterally] : anterior cervical [Supraclavicular Lymph Nodes Enlarged Bilaterally] : supraclavicular [No CVA Tenderness] : no ~M costovertebral angle tenderness [Abnormal Walk] : normal gait [Skin Color & Pigmentation] : normal skin color and pigmentation [] : no rash [No Focal Deficits] : no focal deficits [Oriented To Time, Place, And Person] : oriented to person, place, and time [Impaired Insight] : insight and judgment were intact [Affect] : the affect was normal [Mood] : the mood was normal

## 2022-01-05 ENCOUNTER — LABORATORY RESULT (OUTPATIENT)
Age: 87
End: 2022-01-05

## 2022-01-25 ENCOUNTER — RX RENEWAL (OUTPATIENT)
Age: 87
End: 2022-01-25

## 2022-02-15 LAB — HBA1C MFR BLD HPLC: 5.7

## 2022-02-16 ENCOUNTER — RX RENEWAL (OUTPATIENT)
Age: 87
End: 2022-02-16

## 2022-03-23 ENCOUNTER — APPOINTMENT (OUTPATIENT)
Dept: NEPHROLOGY | Facility: CLINIC | Age: 87
End: 2022-03-23
Payer: MEDICARE

## 2022-03-23 VITALS
HEIGHT: 68 IN | OXYGEN SATURATION: 97 % | SYSTOLIC BLOOD PRESSURE: 140 MMHG | DIASTOLIC BLOOD PRESSURE: 60 MMHG | WEIGHT: 172 LBS | HEART RATE: 68 BPM | TEMPERATURE: 97.2 F | BODY MASS INDEX: 26.07 KG/M2

## 2022-03-23 PROCEDURE — 99214 OFFICE O/P EST MOD 30 MIN: CPT

## 2022-03-23 NOTE — ASSESSMENT
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

## 2022-03-23 NOTE — HISTORY OF PRESENT ILLNESS
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

## 2022-04-07 ENCOUNTER — APPOINTMENT (OUTPATIENT)
Dept: ENDOCRINOLOGY | Facility: CLINIC | Age: 87
End: 2022-04-07
Payer: MEDICARE

## 2022-04-07 VITALS
BODY MASS INDEX: 26.67 KG/M2 | OXYGEN SATURATION: 98 % | SYSTOLIC BLOOD PRESSURE: 128 MMHG | HEIGHT: 68 IN | DIASTOLIC BLOOD PRESSURE: 60 MMHG | HEART RATE: 68 BPM | WEIGHT: 176 LBS

## 2022-04-07 LAB — GLUCOSE BLDC GLUCOMTR-MCNC: 86

## 2022-04-07 PROCEDURE — 99215 OFFICE O/P EST HI 40 MIN: CPT | Mod: 25

## 2022-04-07 PROCEDURE — 82962 GLUCOSE BLOOD TEST: CPT

## 2022-04-07 NOTE — HISTORY OF PRESENT ILLNESS
[FreeTextEntry1] :   88 yo WM coming for f/u DM2, seen Medina Willson about a year ago , but still has some pancakes , ice-cream etc \par        Follow up for TDM2 with micro and macrovascular complications including CABG 2013, HTN, HLD, Nephropathy and neuropathy. \par        Dx 10 years ago. \par        Hga1c down to 6.0 was up to 7.3, nthen  firher up to 7.6 last one dwn to 6 on 3/22 \par        Regimen: \par        Metformin ER  to 500mg PO Qam and 500mg 1tab  QPM no diarrhea\par        off Januvia 100mg PO at Lunch stopped 8/2018\par        Glipizide ER 5mg ER PO qam and 2,5mg qpm he restarted himself a week ago as BS fasting 122 per pt now 70s \par Farxiga 10mg qam added by Dr Cochran \par        Trulicity 1.5mg SC weekly denies side effects \par        Tolerating medications well with no SE. \par        Fell at home Fell on to coffee table. Fractured cervical spine. 3 months ago, lost weight then gained back\par        Checks BG levels tqam fasting only \par        Sporadically throughout the day\par      fasting , once in September 52, no symtoms \par   \par        Microvascular complications: \par        Nephropathy denies Cr 1.4 eGFR 42. Sees Dr. Cochran. decreased Clorthalidone twice a week, stopped Lisinopril with very good BP but lower EGFR \par        Neuropathy symptoms, yes\par        microfilament exam + neuropathy.\par        Retinopathy sees Dr. Fierro. Had Elyea Left no eye Injection has macular degeneration Visit Q6-8 weeks. per pt had some "beginning of retinopathy" will request the note for review  \par        No recent loss of vision or blurry vision. \par        Macrovascular complications: \par        HTN improved. Sees Dr. Chopra. Toprolol ER 25mg PO BID, Clorthalidone 25mg PO Twice weekly. Echo and stress next week. Diovan dcd and now on Losartan 100mg PO Qdaily\par        CAD/CVA yes CABG 2013/Afib. Off Elaquis since the fall on 81mg PO Qdaily\par        Lipids , LDL 58, HDL 28. Lovaza 2g bid, Lipitor 40mg qpm\par        Vitamin B12 liquid once weekly \par        Vitamin D3 1000iu tab Twice daily\par        Iron 325mg PO Qdaily\par        TSH 1.97.

## 2022-04-25 ENCOUNTER — RX RENEWAL (OUTPATIENT)
Age: 87
End: 2022-04-25

## 2022-05-03 ENCOUNTER — RX RENEWAL (OUTPATIENT)
Age: 87
End: 2022-05-03

## 2022-05-20 ENCOUNTER — RX RENEWAL (OUTPATIENT)
Age: 87
End: 2022-05-20

## 2022-05-31 ENCOUNTER — NON-APPOINTMENT (OUTPATIENT)
Age: 87
End: 2022-05-31

## 2022-05-31 ENCOUNTER — APPOINTMENT (OUTPATIENT)
Dept: CARDIOLOGY | Facility: CLINIC | Age: 87
End: 2022-05-31
Payer: MEDICARE

## 2022-05-31 ENCOUNTER — RX RENEWAL (OUTPATIENT)
Age: 87
End: 2022-05-31

## 2022-05-31 VITALS
HEIGHT: 68 IN | DIASTOLIC BLOOD PRESSURE: 90 MMHG | OXYGEN SATURATION: 96 % | HEART RATE: 69 BPM | SYSTOLIC BLOOD PRESSURE: 182 MMHG | TEMPERATURE: 98 F | BODY MASS INDEX: 26.52 KG/M2 | WEIGHT: 175 LBS

## 2022-05-31 DIAGNOSIS — N28.9 DISORDER OF KIDNEY AND URETER, UNSPECIFIED: ICD-10-CM

## 2022-05-31 PROCEDURE — 93000 ELECTROCARDIOGRAM COMPLETE: CPT

## 2022-05-31 PROCEDURE — 99214 OFFICE O/P EST MOD 30 MIN: CPT

## 2022-05-31 RX ORDER — CHLORTHALIDONE 25 MG/1
25 TABLET ORAL
Qty: 60 | Refills: 10 | Status: DISCONTINUED | COMMUNITY
Start: 2022-02-16 | End: 2022-05-31

## 2022-05-31 NOTE — DISCUSSION/SUMMARY
[FreeTextEntry1] : The patient's overall clinical condition is stable.  There have been no acute cardiac symptoms the cardiorespiratory examination is normal there is no significant peripheral edema the electrocardiograph reveals sinus rhythm first-degree AV block right bundle branch block left anterior hemiblock.  This is not a acute change from previous tracings\par \par There was a recent bump in creatinine to 2.0 this appeared to coincide with the prescription for Farxiga.  I agree with this prescription because the class of medications has been shown to have very significant cardiac and renal benefits.  However it might be inducing some degree of dehydration because of its diuretic action.  Therefore I am recommending that the patient discontinue chlorthalidone and we will prescribe amlodipine 2.5 mg to try to maintain adequate blood pressure control.

## 2022-05-31 NOTE — REASON FOR VISIT
[FreeTextEntry1] : Patient returns for follow-up today.  He is followed with a diagnosis of coronary artery disease status post coronary bypass surgery, diabetes mellitus, renal dysfunction.  There was an episode of atrial fibrillation 2 years ago this has never recurred.\par The patient lately has had some difficulty in controlling blood pressure.  Also his latest lab data revealed an increase in creatinine from about 1.5-2.0.

## 2022-06-08 ENCOUNTER — APPOINTMENT (OUTPATIENT)
Dept: GERIATRICS | Facility: CLINIC | Age: 87
End: 2022-06-08
Payer: MEDICARE

## 2022-06-08 VITALS — SYSTOLIC BLOOD PRESSURE: 140 MMHG | RESPIRATION RATE: 18 BRPM | DIASTOLIC BLOOD PRESSURE: 80 MMHG | HEART RATE: 76 BPM

## 2022-06-08 DIAGNOSIS — D63.1 CHRONIC KIDNEY DISEASE, STAGE 3B: ICD-10-CM

## 2022-06-08 DIAGNOSIS — N18.32 CHRONIC KIDNEY DISEASE, STAGE 3B: ICD-10-CM

## 2022-06-08 DIAGNOSIS — N18.30 CHRONIC KIDNEY DISEASE, STAGE 3 UNSPECIFIED: ICD-10-CM

## 2022-06-08 PROCEDURE — 99214 OFFICE O/P EST MOD 30 MIN: CPT

## 2022-06-08 RX ORDER — ISOSORBIDE MONONITRATE 30 MG/1
30 TABLET, EXTENDED RELEASE ORAL DAILY
Refills: 0 | Status: COMPLETED | COMMUNITY
End: 2022-06-08

## 2022-06-08 NOTE — PHYSICAL EXAM
[General Appearance - Alert] : alert [General Appearance - In No Acute Distress] : in no acute distress [General Appearance - Well Nourished] : well nourished [General Appearance - Well Developed] : well developed [Sclera] : the sclera and conjunctiva were normal [Extraocular Movements] : extraocular movements were intact [Normal Oral Mucosa] : normal oral mucosa [No Oral Pallor] : no oral pallor [Neck Appearance] : the appearance of the neck was normal [Respiration, Rhythm And Depth] : normal respiratory rhythm and effort [Exaggerated Use Of Accessory Muscles For Inspiration] : no accessory muscle use [Bowel Sounds] : normal bowel sounds [Abdomen Soft] : soft [Abdomen Tenderness] : non-tender [Cervical Lymph Nodes Enlarged Posterior Bilaterally] : posterior cervical [Cervical Lymph Nodes Enlarged Anterior Bilaterally] : anterior cervical [Supraclavicular Lymph Nodes Enlarged Bilaterally] : supraclavicular [No CVA Tenderness] : no ~M costovertebral angle tenderness [Abnormal Walk] : normal gait [Skin Color & Pigmentation] : normal skin color and pigmentation [] : no rash [No Focal Deficits] : no focal deficits [Oriented To Time, Place, And Person] : oriented to person, place, and time [Impaired Insight] : insight and judgment were intact [Affect] : the affect was normal [Mood] : the mood was normal

## 2022-06-08 NOTE — ASSESSMENT
[FreeTextEntry1] : now on norvasc\par diuretic stopped\par doing quite well and happy with progress\par \par \par \par \par \par \par

## 2022-07-27 ENCOUNTER — RESULT REVIEW (OUTPATIENT)
Age: 87
End: 2022-07-27

## 2022-07-27 ENCOUNTER — APPOINTMENT (OUTPATIENT)
Dept: NEPHROLOGY | Facility: CLINIC | Age: 87
End: 2022-07-27

## 2022-07-27 VITALS
DIASTOLIC BLOOD PRESSURE: 60 MMHG | SYSTOLIC BLOOD PRESSURE: 116 MMHG | WEIGHT: 171 LBS | BODY MASS INDEX: 25.91 KG/M2 | HEIGHT: 68 IN | HEART RATE: 66 BPM | TEMPERATURE: 98.5 F | OXYGEN SATURATION: 99 %

## 2022-07-27 PROCEDURE — 99214 OFFICE O/P EST MOD 30 MIN: CPT

## 2022-07-27 RX ORDER — POLYETHYLENE GLYCOL 400 AND PROPYLENE GLYCOL 4; 3 MG/ML; MG/ML
0.4-0.3 SOLUTION/ DROPS OPHTHALMIC
Refills: 0 | Status: DISCONTINUED | COMMUNITY
End: 2022-07-27

## 2022-07-27 RX ORDER — EAR PLUGS
1000 EACH OTIC (EAR)
Refills: 0 | Status: DISCONTINUED | COMMUNITY
End: 2022-07-27

## 2022-07-27 RX ORDER — PROPYLENE GLYCOL 0.06 MG/ML
0.6 SOLUTION/ DROPS OPHTHALMIC
Refills: 0 | Status: ACTIVE | COMMUNITY

## 2022-07-27 RX ORDER — CETIRIZINE HYDROCHLORIDE 10 MG/1
10 TABLET, CHEWABLE ORAL DAILY
Qty: 30 | Refills: 3 | Status: DISCONTINUED | COMMUNITY
Start: 2019-12-18 | End: 2022-07-27

## 2022-07-27 NOTE — HISTORY OF PRESENT ILLNESS
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

## 2022-07-27 NOTE — HISTORY OF PRESENT ILLNESS
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

## 2022-08-18 ENCOUNTER — NON-APPOINTMENT (OUTPATIENT)
Age: 87
End: 2022-08-18

## 2022-08-18 ENCOUNTER — APPOINTMENT (OUTPATIENT)
Dept: GERIATRICS | Facility: CLINIC | Age: 87
End: 2022-08-18

## 2022-08-18 VITALS
OXYGEN SATURATION: 99 % | HEIGHT: 68 IN | RESPIRATION RATE: 18 BRPM | WEIGHT: 177 LBS | DIASTOLIC BLOOD PRESSURE: 70 MMHG | BODY MASS INDEX: 26.83 KG/M2 | HEART RATE: 76 BPM | SYSTOLIC BLOOD PRESSURE: 150 MMHG

## 2022-08-18 VITALS
HEART RATE: 76 BPM | SYSTOLIC BLOOD PRESSURE: 150 MMHG | RESPIRATION RATE: 18 BRPM | DIASTOLIC BLOOD PRESSURE: 70 MMHG | OXYGEN SATURATION: 99 %

## 2022-08-18 DIAGNOSIS — R53.1 WEAKNESS: ICD-10-CM

## 2022-08-18 DIAGNOSIS — Z87.898 PERSONAL HISTORY OF OTHER SPECIFIED CONDITIONS: ICD-10-CM

## 2022-08-18 DIAGNOSIS — R26.81 UNSTEADINESS ON FEET: ICD-10-CM

## 2022-08-18 DIAGNOSIS — Z23 ENCOUNTER FOR IMMUNIZATION: ICD-10-CM

## 2022-08-18 DIAGNOSIS — R26.89 OTHER ABNORMALITIES OF GAIT AND MOBILITY: ICD-10-CM

## 2022-08-18 DIAGNOSIS — R25.2 CRAMP AND SPASM: ICD-10-CM

## 2022-08-18 DIAGNOSIS — Z92.29 PERSONAL HISTORY OF OTHER DRUG THERAPY: ICD-10-CM

## 2022-08-18 PROCEDURE — 99213 OFFICE O/P EST LOW 20 MIN: CPT

## 2022-08-23 PROBLEM — R25.2 MUSCLE CRAMPS: Status: RESOLVED | Noted: 2019-11-12 | Resolved: 2022-08-23

## 2022-08-23 PROBLEM — R26.81 GAIT INSTABILITY: Status: ACTIVE | Noted: 2020-07-20

## 2022-08-23 PROBLEM — R53.1 WEAKNESS: Status: ACTIVE | Noted: 2020-07-20

## 2022-08-23 PROBLEM — R26.89 IMBALANCE: Status: ACTIVE | Noted: 2020-07-20

## 2022-08-23 NOTE — HISTORY OF PRESENT ILLNESS
[FreeTextEntry1] : 2 falls in one week with bruising\par on 8/10 and 8/13\par was unable to stand from either fall\par did not seek emergent care at the time\par \par no dizziness, no Loc, did not feel sick, no palpitations\par felt he tripped due to using a certain pair of shoes\par has stopped using the shoes and also now\par using a new rollator which he ordered from [employer].\par

## 2022-08-23 NOTE — PHYSICAL EXAM
[Normal Gait] : abnormal gait [FreeTextEntry1] : bruising periorbital on OS [de-identified] : scrape with scab on bridge of nose [de-identified] : yellowing bruise on left side chest ~6x6, abnormal gait, small steps

## 2022-08-31 ENCOUNTER — LABORATORY RESULT (OUTPATIENT)
Age: 87
End: 2022-08-31

## 2022-09-14 ENCOUNTER — LABORATORY RESULT (OUTPATIENT)
Age: 87
End: 2022-09-14

## 2022-09-19 NOTE — HISTORY OF PRESENT ILLNESS
Shingrix #2 given.   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 review of chart shows creatinine 1.4 in 2013.\par \par  Seems to fluctuate between 1.1 to 1.4 since 2013 - few elevated readings of 1.6, likely related to hydration;\par \par  Is taking losartan. UA showed only trace protein. \par \par States he was hospitalized in 7/2017 with SBP > 230.        \par \par  of note has vein harvested on L leg, so w/o diuretic exp edema -.

## 2022-09-22 ENCOUNTER — APPOINTMENT (OUTPATIENT)
Dept: ENDOCRINOLOGY | Facility: CLINIC | Age: 87
End: 2022-09-22

## 2022-10-10 RX ORDER — OMEGA-3-ACID ETHYL ESTERS CAPSULES 1 G/1
1 CAPSULE, LIQUID FILLED ORAL
Qty: 360 | Refills: 3 | Status: DISCONTINUED | COMMUNITY
Start: 2019-05-28 | End: 2022-10-10

## 2022-10-31 ENCOUNTER — APPOINTMENT (OUTPATIENT)
Dept: GERIATRICS | Facility: CLINIC | Age: 87
End: 2022-10-31

## 2022-10-31 ENCOUNTER — NON-APPOINTMENT (OUTPATIENT)
Age: 87
End: 2022-10-31

## 2022-10-31 VITALS — RESPIRATION RATE: 20 BRPM | HEART RATE: 75 BPM | DIASTOLIC BLOOD PRESSURE: 58 MMHG | SYSTOLIC BLOOD PRESSURE: 138 MMHG

## 2022-10-31 VITALS — DIASTOLIC BLOOD PRESSURE: 58 MMHG | RESPIRATION RATE: 20 BRPM | SYSTOLIC BLOOD PRESSURE: 138 MMHG | HEART RATE: 75 BPM
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
## 2022-10-31 DIAGNOSIS — S90.30XA CONTUSION OF UNSPECIFIED FOOT, INITIAL ENCOUNTER: ICD-10-CM

## 2022-10-31 PROCEDURE — 99213 OFFICE O/P EST LOW 20 MIN: CPT

## 2022-11-01 PROBLEM — S90.30XA: Status: ACTIVE | Noted: 2022-10-31

## 2022-11-01 NOTE — HISTORY OF PRESENT ILLNESS
[FreeTextEntry1] : Pt has concerns regarding a new large\par redness on top of his left foot\par no pain, no warmth, does not remember a trauma\par pt has dm and concerned to ensure no infections of ft\par recently restarted Eliquis

## 2022-11-01 NOTE — PHYSICAL EXAM
[de-identified] : mild bilateral le edema [de-identified] : L foot dorsum 4x6 area of dark redness with a small darker central discoloration. Skin is not warm or tender

## 2022-11-01 NOTE — ASSESSMENT
[FreeTextEntry1] : this is likely a bruise, not cellulitis\par marked the area and pt will observe for any increased redness or\par warmth\par d/w Dr. Taveras and pt will see him on 11/3 for f/u

## 2022-11-16 ENCOUNTER — APPOINTMENT (OUTPATIENT)
Dept: NEPHROLOGY | Facility: CLINIC | Age: 87
End: 2022-11-16

## 2022-11-16 ENCOUNTER — RESULT REVIEW (OUTPATIENT)
Age: 87
End: 2022-11-16

## 2022-11-16 VITALS
OXYGEN SATURATION: 99 % | TEMPERATURE: 96 F | BODY MASS INDEX: 26.52 KG/M2 | DIASTOLIC BLOOD PRESSURE: 50 MMHG | WEIGHT: 175 LBS | HEART RATE: 75 BPM | SYSTOLIC BLOOD PRESSURE: 114 MMHG | HEIGHT: 68 IN

## 2022-11-16 PROCEDURE — 99214 OFFICE O/P EST MOD 30 MIN: CPT

## 2022-11-16 NOTE — HISTORY OF PRESENT ILLNESS
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

## 2022-11-28 ENCOUNTER — RESULT REVIEW (OUTPATIENT)
Age: 87
End: 2022-11-28

## 2022-11-28 ENCOUNTER — APPOINTMENT (OUTPATIENT)
Dept: NEPHROLOGY | Facility: CLINIC | Age: 87
End: 2022-11-28

## 2022-11-28 PROCEDURE — 36415 COLL VENOUS BLD VENIPUNCTURE: CPT

## 2022-12-05 ENCOUNTER — RX RENEWAL (OUTPATIENT)
Age: 87
End: 2022-12-05

## 2022-12-06 ENCOUNTER — NON-APPOINTMENT (OUTPATIENT)
Age: 87
End: 2022-12-06

## 2022-12-06 ENCOUNTER — APPOINTMENT (OUTPATIENT)
Dept: CARDIOLOGY | Facility: CLINIC | Age: 87
End: 2022-12-06

## 2022-12-06 VITALS
BODY MASS INDEX: 26.56 KG/M2 | TEMPERATURE: 97.8 F | RESPIRATION RATE: 18 BRPM | WEIGHT: 175.25 LBS | SYSTOLIC BLOOD PRESSURE: 124 MMHG | HEIGHT: 68 IN | OXYGEN SATURATION: 98 % | DIASTOLIC BLOOD PRESSURE: 62 MMHG | HEART RATE: 60 BPM

## 2022-12-06 PROCEDURE — 93000 ELECTROCARDIOGRAM COMPLETE: CPT

## 2022-12-06 PROCEDURE — 99214 OFFICE O/P EST MOD 30 MIN: CPT

## 2022-12-06 NOTE — REASON FOR VISIT
[FreeTextEntry1] : Patient returns for follow-up today.  He is followed with a diagnosis of coronary artery disease status post coronary bypass, paroxysmal atrial fibrillation and diabetes\par \par The patient's condition has been stable.  He participates in regular exercise at [facility] where he resides.  There have been no significant symptoms of chest pain, angina or unusual shortness of breath.  There have been no known recent recurrences of atrial fibrillation.

## 2022-12-06 NOTE — DISCUSSION/SUMMARY
[FreeTextEntry1] : Patient's clinical condition has been stable.  In particular there have been no recurrences of atrial fibrillation.  Also anginal symptoms are now under excellent control.  The only occasion for nitroglycerin is in a prophylactic fashion prior to exercise.  Cardiorespiratory examination is stable the extremities revealed no edema the vital signs are stable the electrocardiogram reveals sinus rhythm right bundle branch block left anterior fascicular block and first-degree AV block similar to previous tracings.  Based on my evaluation and assessment I believe the patient's cardiovascular status is stable the current medications will be continued follow-up in 6 months.

## 2022-12-06 NOTE — PHYSICAL EXAM

## 2022-12-22 ENCOUNTER — APPOINTMENT (OUTPATIENT)
Dept: ENDOCRINOLOGY | Facility: CLINIC | Age: 87
End: 2022-12-22

## 2022-12-22 VITALS
OXYGEN SATURATION: 98 % | DIASTOLIC BLOOD PRESSURE: 60 MMHG | HEIGHT: 68 IN | HEART RATE: 81 BPM | SYSTOLIC BLOOD PRESSURE: 122 MMHG | WEIGHT: 176 LBS | BODY MASS INDEX: 26.67 KG/M2

## 2022-12-22 LAB
25(OH)D3 SERPL-MCNC: 60.2 NG/ML
ALBUMIN SERPL ELPH-MCNC: 4.4 G/DL
ALP BLD-CCNC: 47 U/L
ALT SERPL-CCNC: 14 U/L
ANION GAP SERPL CALC-SCNC: 10 MMOL/L
AST SERPL-CCNC: 14 U/L
BILIRUB SERPL-MCNC: 0.3 MG/DL
BUN SERPL-MCNC: 37 MG/DL
CALCIUM SERPL-MCNC: 10.1 MG/DL
CHLORIDE SERPL-SCNC: 102 MMOL/L
CHOLEST SERPL-MCNC: 118 MG/DL
CO2 SERPL-SCNC: 28 MMOL/L
CREAT SERPL-MCNC: 1.87 MG/DL
EGFR: 34 ML/MIN/1.73M2
ESTIMATED AVERAGE GLUCOSE: 123 MG/DL
GLUCOSE BLDC GLUCOMTR-MCNC: 105
GLUCOSE SERPL-MCNC: 108 MG/DL
HBA1C MFR BLD HPLC: 5.9 %
HDLC SERPL-MCNC: 39 MG/DL
LDLC SERPL CALC-MCNC: 49 MG/DL
NONHDLC SERPL-MCNC: 79 MG/DL
POTASSIUM SERPL-SCNC: 5.2 MMOL/L
PROT SERPL-MCNC: 7 G/DL
SODIUM SERPL-SCNC: 140 MMOL/L
TRIGL SERPL-MCNC: 150 MG/DL

## 2022-12-22 PROCEDURE — 82962 GLUCOSE BLOOD TEST: CPT

## 2022-12-22 PROCEDURE — 99215 OFFICE O/P EST HI 40 MIN: CPT | Mod: 25

## 2022-12-22 NOTE — HISTORY OF PRESENT ILLNESS
[FreeTextEntry1] : 89 yo WM coming for f/u DM2, seen Medina Johnsonanthony about a year ago , but still has some pancakes , ice-cream etc \par        Follow up for TDM2 with micro and macrovascular complications including CABG 2013, HTN, HLD, Nephropathy and neuropathy. \par        Dx 10 years ago. \par        Hga1c down to 6.0 was up to 7.3, nthen  firher up to 7.6 last one dwn to 6 on 3/22 today A1c very good at 5.9 December 2022\par        Regimen: \par        Metformin ER  to 500mg PO Qam and 500mg 1tab  QPM no diarrhea\par        off Januvia 100mg PO at Lunch stopped 8/2018\par        Glipizide ER 5mg ER PO qam dose decreased last visit April 2022 and no more low sugar since then per patient\par Farxiga 10mg qam added by Dr Cochran \par        Trulicity 1.5mg SC weekly denies side effects some chronic constipation he believes constipation started after Farxiga\par        Tolerating medications well with no SE. \par        Fell at home Fell on to coffee table. Fractured cervical spine. 3 months ago, lost weight then gained back\par        Checks BG levels tqam fasting only \par        Sporadically throughout the day\par      fasting , once in September 52, no symtoms \par   \par        Microvascular complications: \par        Nephropathy denies Cr 1.4 eGFR 42. Sees Dr. Cochran. decreased Clorthalidone twice a week, stopped Lisinopril with very good BP but lower EGFR \par        Neuropathy symptoms, yes\par        microfilament exam + neuropathy.\par        Retinopathy sees Dr. Fierro. Had Elyea Left no eye Injection has macular degeneration Visit Q6-8 weeks. per pt had some "beginning of retinopathy" will request the note for review  \par        No recent loss of vision or blurry vision. \par        Macrovascular complications: \par        HTN improved. Sees Dr. Chopra. Toprolol ER 25mg PO BID, Clorthalidone 25mg PO Twice weekly. Echo and stress next week. Diovan dcd and now on Losartan 100mg PO Qdaily\par        CAD/CVA yes CABG 2013/Afib. Off Elaquis since the fall on 81mg PO Qdaily\par        Lipids , LDL 58, HDL 28. Lovaza 2g bid, Lipitor 40mg qpm\par        Vitamin B12 liquid once weekly \par        Vitamin D3 1000iu tab Twice daily\par        Iron 325mg PO Qdaily\par        TSH 1.97.

## 2022-12-27 ENCOUNTER — APPOINTMENT (OUTPATIENT)
Dept: GERIATRICS | Facility: CLINIC | Age: 87
End: 2022-12-27
Payer: MEDICARE

## 2022-12-27 VITALS
OXYGEN SATURATION: 98 % | WEIGHT: 177 LBS | DIASTOLIC BLOOD PRESSURE: 60 MMHG | BODY MASS INDEX: 26.83 KG/M2 | HEIGHT: 68 IN | SYSTOLIC BLOOD PRESSURE: 148 MMHG | TEMPERATURE: 96.4 F | HEART RATE: 64 BPM

## 2022-12-27 PROCEDURE — G0439: CPT

## 2023-01-03 ENCOUNTER — LABORATORY RESULT (OUTPATIENT)
Age: 88
End: 2023-01-03

## 2023-01-04 NOTE — HISTORY OF PRESENT ILLNESS
[Retired] : retired from work [Compliant with medications] : compliant with medications [Fully Independent] : fully independent [Does not drive] : does not drive [History of falls] : history of falls [Seatbelts] : seatbelts [Smoke Detectors] : smoke detectors [Bathroom Grab Bars] : bathroom grab bars [Fall Prevention Measures] : fall prevention measures [Two or more falls in past year] : Patient reported two or more falls in the past year [Patient is independent with] : bathing [Completely Independent] : Completely independent. [] : managing medications [Independent] : managing finances [Cane] : cane [Smoke Detector] : smoke detector [Carbon Monoxide Detector] : carbon monoxide detector [Grab Bars] : grab bars [Night Light] : night light [Anti-Slip Measures] : anti-slip measures [PMH Reviewed and Updated] : past medical history reviewed and updated [PSH Reviewed and Updated] : past surgical history reviewed and updated [Family History Reviewed and Updated] : family history reviewed and updated [Medication and Allergies Reconciled] : medication and allergies reconciled [Over the Past 2 Weeks, Have You Felt Down, Depressed, or Hopeless?] : 1.) Over the past 2 weeks, have you felt down, depressed, or hopeless? No [Over the Past 2 Weeks, Have You Felt Little Interest or Pleasure Doing Things?] : 2.) Over the past 2 weeks, have you felt little interest or pleasure doing things? No [Adequate] : adequate [Spouse] : spouse [Friends] : friends [FreeTextEntry6] : 2 FALLS IN THE LAST YEAR-NO INJURIES [FreeTextEntry1] : Feeling well\par admits to poor compliance with diet - some weight gain since summer\par new diabetic regimen and working with endocrine\par partner with profound hearing loss - seems to be declining [0] : 2) Feeling down, depressed, or hopeless: Not at all (0) [PHQ-2 Negative - No further assessment needed] : PHQ-2 Negative - No further assessment needed [[id number]] : 0

## 2023-01-04 NOTE — ASSESSMENT
[FreeTextEntry1] : up to date on all screenings\par will check PSA with next labs at [facility]\par doing quite well and happy with progress\par \par MOLST signed showing no changes in decisions\par \par tdap due in 2025\par consider shingrix in spring\par \par \par

## 2023-01-31 ENCOUNTER — RX RENEWAL (OUTPATIENT)
Age: 88
End: 2023-01-31

## 2023-03-16 ENCOUNTER — APPOINTMENT (OUTPATIENT)
Dept: NEPHROLOGY | Facility: CLINIC | Age: 88
End: 2023-03-16
Payer: MEDICARE

## 2023-03-16 ENCOUNTER — RESULT REVIEW (OUTPATIENT)
Age: 88
End: 2023-03-16

## 2023-03-16 VITALS
HEIGHT: 68 IN | BODY MASS INDEX: 26.45 KG/M2 | SYSTOLIC BLOOD PRESSURE: 110 MMHG | HEART RATE: 68 BPM | DIASTOLIC BLOOD PRESSURE: 44 MMHG | WEIGHT: 174.5 LBS | OXYGEN SATURATION: 97 %

## 2023-03-16 PROCEDURE — 99214 OFFICE O/P EST MOD 30 MIN: CPT

## 2023-03-16 RX ORDER — DAPAGLIFLOZIN 10 MG/1
10 TABLET, FILM COATED ORAL
Qty: 90 | Refills: 1 | Status: DISCONTINUED | COMMUNITY
Start: 2021-07-28 | End: 2023-03-16

## 2023-03-16 RX ORDER — RANOLAZINE 500 MG/1
500 TABLET, EXTENDED RELEASE ORAL
Refills: 0 | Status: DISCONTINUED | COMMUNITY
End: 2023-03-16

## 2023-03-16 NOTE — HISTORY OF PRESENT ILLNESS
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

## 2023-03-31 ENCOUNTER — RX RENEWAL (OUTPATIENT)
Age: 88
End: 2023-03-31

## 2023-04-18 ENCOUNTER — RX RENEWAL (OUTPATIENT)
Age: 88
End: 2023-04-18

## 2023-04-20 RX ORDER — BLOOD SUGAR DIAGNOSTIC
STRIP MISCELLANEOUS
Qty: 200 | Refills: 1 | Status: ACTIVE | COMMUNITY
Start: 2019-08-09 | End: 1900-01-01

## 2023-04-20 RX ORDER — LANCETS 28 GAUGE
EACH MISCELLANEOUS
Qty: 200 | Refills: 1 | Status: ACTIVE | COMMUNITY
Start: 2019-08-09 | End: 1900-01-01

## 2023-06-01 ENCOUNTER — APPOINTMENT (OUTPATIENT)
Dept: GERIATRICS | Facility: CLINIC | Age: 88
End: 2023-06-01
Payer: MEDICARE

## 2023-06-01 VITALS
HEART RATE: 65 BPM | DIASTOLIC BLOOD PRESSURE: 56 MMHG | OXYGEN SATURATION: 98 % | BODY MASS INDEX: 27.58 KG/M2 | SYSTOLIC BLOOD PRESSURE: 134 MMHG | TEMPERATURE: 97.5 F | HEIGHT: 68 IN | WEIGHT: 182 LBS

## 2023-06-01 PROCEDURE — 99214 OFFICE O/P EST MOD 30 MIN: CPT

## 2023-06-01 NOTE — ASSESSMENT
[FreeTextEntry1] : will check PSA in fall per urology\par rest of labs next week fasting at [facility]\par assure no signs of abnormal TFT or dehydration\par as he is on new medicaitons will check Creatinine as well\par partner declining [facility] - \par \par next visit annual at office \par \par

## 2023-06-01 NOTE — PHYSICAL EXAM
[General Appearance - Alert] : alert [General Appearance - In No Acute Distress] : in no acute distress [General Appearance - Well Nourished] : well nourished [General Appearance - Well Developed] : well developed [Normal Oral Mucosa] : normal oral mucosa [No Oral Pallor] : no oral pallor [Neck Appearance] : the appearance of the neck was normal [] : no respiratory distress [Respiration, Rhythm And Depth] : normal respiratory rhythm and effort [Exaggerated Use Of Accessory Muscles For Inspiration] : no accessory muscle use [Bowel Sounds] : normal bowel sounds [Abdomen Soft] : soft [Abdomen Tenderness] : non-tender [Cervical Lymph Nodes Enlarged Posterior Bilaterally] : posterior cervical [Cervical Lymph Nodes Enlarged Anterior Bilaterally] : anterior cervical [No CVA Tenderness] : no ~M costovertebral angle tenderness [Supraclavicular Lymph Nodes Enlarged Bilaterally] : supraclavicular [Abnormal Walk] : normal gait

## 2023-06-01 NOTE — REVIEW OF SYSTEMS
[Fever] : no fever [Chills] : no chills [Red Eyes] : eyes not red [Discharge From Eyes] : no purulent discharge from the eyes [Nosebleeds] : no nosebleeds [Nasal Discharge] : no nasal discharge [Sore Throat] : no sore throat [Hoarseness] : hoarseness [Chest Pain] : no chest pain [Palpitations] : no palpitations [Shortness Of Breath] : no shortness of breath [Wheezing] : no wheezing [Abdominal Pain] : no abdominal pain [Cough] : no cough [Vomiting] : no vomiting [Genital Lesion] : no genital lesions [Testicular Pain] : no testicular pain [Limb Pain] : no limb pain [Limb Swelling] : no limb swelling [Skin Lesions] : no skin lesions [Skin Wound] : no skin wound [Fainting] : no fainting [Dizziness] : no dizziness [Anxiety] : no anxiety [Depression] : no depression

## 2023-06-01 NOTE — HISTORY OF PRESENT ILLNESS
[No falls in past year] : Patient reported no falls in the past year [FreeTextEntry1] : Feeling well\par new diabetic regimen and working with endocrine as well as nephrology\par due to have Cr and A1C checked\par will also need PSA in Sept per Dr Marshall\par partner with profound hearing loss - seems to be declining - now on [facility] due to decline\par he admits to palpiations at night - checks rhythm and appears to be sinus tachycardia\par unclear if he is drinking enough water\par denies caffeine\par but is feeling stress about partners decline\par interestingly the tachycardia resolves with standing  [Completely Independent] : Completely independent. [Smoke Detector] : smoke detector [0] : 2) Feeling down, depressed, or hopeless: Not at all (0) [PHQ-2 Negative - No further assessment needed] : PHQ-2 Negative - No further assessment needed [[id number]] : 0

## 2023-06-13 ENCOUNTER — APPOINTMENT (OUTPATIENT)
Dept: CARDIOLOGY | Facility: CLINIC | Age: 88
End: 2023-06-13
Payer: MEDICARE

## 2023-06-13 ENCOUNTER — NON-APPOINTMENT (OUTPATIENT)
Age: 88
End: 2023-06-13

## 2023-06-13 VITALS
HEART RATE: 66 BPM | HEIGHT: 68 IN | SYSTOLIC BLOOD PRESSURE: 140 MMHG | DIASTOLIC BLOOD PRESSURE: 62 MMHG | WEIGHT: 180 LBS | BODY MASS INDEX: 27.28 KG/M2 | OXYGEN SATURATION: 99 %

## 2023-06-13 DIAGNOSIS — I45.10 UNSPECIFIED RIGHT BUNDLE-BRANCH BLOCK: ICD-10-CM

## 2023-06-13 PROCEDURE — 93000 ELECTROCARDIOGRAM COMPLETE: CPT

## 2023-06-13 PROCEDURE — 99214 OFFICE O/P EST MOD 30 MIN: CPT

## 2023-06-13 NOTE — REASON FOR VISIT
[FreeTextEntry1] : The patient returns for follow-up today I have followed him for a number of years with the principal diagnoses of coronary artery disease status post coronary bypass diabetes mellitus, diabetic neuropathy, gait instability, and paroxysmal atrial fibrillation.\par \par The patient's cardiac status has been relatively stable.  He has had no recent known atrial fibrillation.  He has a cardia device at home and occasionally records sinus tachycardia which occurs and resolve spontaneously with no clear precipitating factor.\par \par There has been no chest discomfort or anginal symptoms.  There is no evidence of CHF or fluid retention\par \par The patient does have a problem of gait instability which has occasionally led to falls.\par \par

## 2023-06-13 NOTE — DISCUSSION/SUMMARY
[FreeTextEntry1] : Pleased with the patient's progress.  Cardiac status remains stable.  I am concerned about his instability of gait.  Any type of fall or injury is especially dangerous because of the Eliquis anticoagulation.  The patient has been taken off aspirin\par \par Cardiorespiratory examination unremarkable extremities reveal trace leg edema\par \par EKG sinus rhythm first-degree AV block right bundle branch block left anterior hemiblock with no acute change.\par \par The current medications will be continued.  The patient will be using a cane and a rollator to try to prevent falls.

## 2023-07-17 ENCOUNTER — RX RENEWAL (OUTPATIENT)
Age: 88
End: 2023-07-17

## 2023-07-19 ENCOUNTER — APPOINTMENT (OUTPATIENT)
Dept: NEPHROLOGY | Facility: CLINIC | Age: 88
End: 2023-07-19
Payer: MEDICARE

## 2023-07-19 VITALS
RESPIRATION RATE: 17 BRPM | HEART RATE: 61 BPM | WEIGHT: 180 LBS | BODY MASS INDEX: 27.28 KG/M2 | TEMPERATURE: 97.9 F | DIASTOLIC BLOOD PRESSURE: 60 MMHG | HEIGHT: 68 IN | OXYGEN SATURATION: 99 % | SYSTOLIC BLOOD PRESSURE: 132 MMHG

## 2023-07-19 PROCEDURE — 99213 OFFICE O/P EST LOW 20 MIN: CPT

## 2023-07-19 NOTE — HISTORY OF PRESENT ILLNESS
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

## 2023-07-27 ENCOUNTER — APPOINTMENT (OUTPATIENT)
Dept: ENDOCRINOLOGY | Facility: CLINIC | Age: 88
End: 2023-07-27
Payer: MEDICARE

## 2023-07-27 VITALS
HEIGHT: 68 IN | OXYGEN SATURATION: 99 % | WEIGHT: 183 LBS | SYSTOLIC BLOOD PRESSURE: 126 MMHG | DIASTOLIC BLOOD PRESSURE: 62 MMHG | BODY MASS INDEX: 27.74 KG/M2 | HEART RATE: 74 BPM

## 2023-07-27 DIAGNOSIS — E78.1 PURE HYPERGLYCERIDEMIA: ICD-10-CM

## 2023-07-27 DIAGNOSIS — E55.9 VITAMIN D DEFICIENCY, UNSPECIFIED: ICD-10-CM

## 2023-07-27 DIAGNOSIS — E01.0 IODINE-DEFICIENCY RELATED DIFFUSE (ENDEMIC) GOITER: ICD-10-CM

## 2023-07-27 DIAGNOSIS — E66.3 OVERWEIGHT: ICD-10-CM

## 2023-07-27 LAB
GLUCOSE BLDC GLUCOMTR-MCNC: 155
HBA1C MFR BLD HPLC: 6.2

## 2023-07-27 PROCEDURE — 99215 OFFICE O/P EST HI 40 MIN: CPT | Mod: 25

## 2023-07-27 PROCEDURE — 82962 GLUCOSE BLOOD TEST: CPT

## 2023-07-27 RX ORDER — MENTHOL/CAMPHOR 0.5 %-0.5%
1000 LOTION (ML) TOPICAL
Refills: 0 | Status: DISCONTINUED | COMMUNITY
End: 2023-07-27

## 2023-07-27 NOTE — PHYSICAL EXAM
[Alert] : alert [Well Nourished] : well nourished [No Acute Distress] : no acute distress [Well Developed] : well developed [Normal Sclera/Conjunctiva] : normal sclera/conjunctiva [EOMI] : extra ocular movement intact [No Proptosis] : no proptosis [Normal Oropharynx] : the oropharynx was normal [No Respiratory Distress] : no respiratory distress [No Accessory Muscle Use] : no accessory muscle use [Clear to Auscultation] : lungs were clear to auscultation bilaterally [Normal S1, S2] : normal S1 and S2 [Normal Rate] : heart rate was normal [Regular Rhythm] : with a regular rhythm [No Edema] : no peripheral edema [Pedal Pulses Normal] : the pedal pulses are present [Normal Bowel Sounds] : normal bowel sounds [Not Tender] : non-tender [Not Distended] : not distended [Soft] : abdomen soft [Normal Anterior Cervical Nodes] : no anterior cervical lymphadenopathy [Normal Posterior Cervical Nodes] : no posterior cervical lymphadenopathy [No Spinal Tenderness] : no spinal tenderness [Spine Straight] : spine straight [No Stigmata of Cushings Syndrome] : no stigmata of Cushings Syndrome [Normal Gait] : normal gait [Normal Strength/Tone] : muscle strength and tone were normal [No Rash] : no rash [Normal Reflexes] : deep tendon reflexes were 2+ and symmetric [No Tremors] : no tremors [Oriented x3] : oriented to person, place, and time [Acanthosis Nigricans] : no acanthosis nigricans [de-identified] : Left-sided thyroid nodule about 1.5 cm mildly increasing consistency mobile with swallowing painless

## 2023-07-27 NOTE — HISTORY OF PRESENT ILLNESS
[FreeTextEntry1] : 89 yo WM coming for f/u DM2, seen Medinajosue Willson about a year ago , but still has some pancakes , ice-cream etc but much less \par        Follow up for TDM2 with micro and macrovascular complications including CABG 2013, HTN, HLD, Nephropathy and neuropathy. \par        Dx 10 years ago. \par        Hga1c down to 6.0 was up to 7.3, nthen  firher up to 7.6 last one dwn to 6 on 3/22 today A1c very good at 5.9 December 2022\par        Regimen: \par        Metformin ER  to 500mg PO Qam and 500mg 1tab  QPM no diarrhea\par        off Januvia 100mg PO at Lunch stopped 8/2018\par        Glipizide ER 5mg ER PO qam dose decreased last visit April 2022 and no more low sugar since then per patient\par Farxiga 10mg qam added by Dr Cochran \par        Trulicity 1.5mg SC weekly denies side effects some chronic constipation he believes constipation started after Farxiga\par        Tolerating medications well with no SE. \par        Fell at home Fell on to coffee table. Fractured cervical spine. 3 months ago, lost weight then gained back\par        Checks BG levels tqam fasting only \par        Sporadically throughout the day\par      fasting , once in September 52, no symtoms \par   \par        Microvascular complications: \par        Nephropathy denies Cr 1.4 eGFR 42. Sees Dr. Cochran. decreased Clorthalidone twice a week, stopped Lisinopril with very good BP but lower EGFR \par        Neuropathy symptoms, yes\par        microfilament exam + neuropathy.\par        Retinopathy sees Dr. Fierro. Had Elyea Left no eye Injection has macular degeneration Visit Q6-8 weeks. per pt had some "beginning of retinopathy" will request the note for review  \par        No recent loss of vision or blurry vision. \par        Macrovascular complications: \par        HTN improved. Sees Dr. Chopra. Toprolol ER 25mg PO BID, Clorthalidone 25mg PO Twice weekly. Echo and stress next week. Diovan dcd and now on Losartan 100mg PO Qdaily\par        CAD/CVA yes CABG 2013/Afib. Off Elaquis since the fall on 81mg PO Qdaily\par        Lipids , LDL 58, HDL 28. Lovaza 2g bid, Lipitor 40mg qpm\par        Vitamin B12 liquid once weekly \par        Vitamin D3 1000iu tab Twice daily\par        Iron 325mg PO Qdaily\par        TSH 1.97.

## 2023-07-31 ENCOUNTER — RESULT REVIEW (OUTPATIENT)
Age: 88
End: 2023-07-31

## 2023-08-22 ENCOUNTER — LABORATORY RESULT (OUTPATIENT)
Age: 88
End: 2023-08-22

## 2023-10-05 DIAGNOSIS — H91.90 UNSPECIFIED HEARING LOSS, UNSPECIFIED EAR: ICD-10-CM

## 2023-11-02 ENCOUNTER — RESULT REVIEW (OUTPATIENT)
Age: 88
End: 2023-11-02

## 2023-11-02 ENCOUNTER — APPOINTMENT (OUTPATIENT)
Dept: NEPHROLOGY | Facility: CLINIC | Age: 88
End: 2023-11-02
Payer: MEDICARE

## 2023-11-02 VITALS
HEART RATE: 62 BPM | SYSTOLIC BLOOD PRESSURE: 153 MMHG | WEIGHT: 186 LBS | HEIGHT: 68 IN | TEMPERATURE: 98 F | DIASTOLIC BLOOD PRESSURE: 61 MMHG | OXYGEN SATURATION: 100 % | BODY MASS INDEX: 28.19 KG/M2

## 2023-11-02 VITALS — SYSTOLIC BLOOD PRESSURE: 145 MMHG | DIASTOLIC BLOOD PRESSURE: 68 MMHG

## 2023-11-02 PROCEDURE — 99214 OFFICE O/P EST MOD 30 MIN: CPT

## 2023-11-17 ENCOUNTER — RX RENEWAL (OUTPATIENT)
Age: 88
End: 2023-11-17

## 2023-11-17 RX ORDER — OMEGA-3-ACID ETHYL ESTERS CAPSULES 1 G/1
1 CAPSULE, LIQUID FILLED ORAL
Qty: 360 | Refills: 3 | Status: ACTIVE | COMMUNITY
Start: 1900-01-01 | End: 1900-01-01

## 2023-11-22 ENCOUNTER — APPOINTMENT (OUTPATIENT)
Dept: CARDIOLOGY | Facility: CLINIC | Age: 88
End: 2023-11-22

## 2023-11-29 ENCOUNTER — TRANSCRIPTION ENCOUNTER (OUTPATIENT)
Age: 88
End: 2023-11-29

## 2023-12-05 ENCOUNTER — RESULT REVIEW (OUTPATIENT)
Age: 88
End: 2023-12-05

## 2023-12-05 ENCOUNTER — APPOINTMENT (OUTPATIENT)
Dept: NEUROSURGERY | Facility: CLINIC | Age: 88
End: 2023-12-05
Payer: MEDICARE

## 2023-12-05 VITALS
WEIGHT: 186 LBS | BODY MASS INDEX: 28.19 KG/M2 | HEIGHT: 68 IN | DIASTOLIC BLOOD PRESSURE: 69 MMHG | SYSTOLIC BLOOD PRESSURE: 104 MMHG | HEART RATE: 91 BPM

## 2023-12-05 DIAGNOSIS — Z79.01 LONG TERM (CURRENT) USE OF ANTICOAGULANTS: ICD-10-CM

## 2023-12-05 PROCEDURE — 99205 OFFICE O/P NEW HI 60 MIN: CPT

## 2023-12-06 ENCOUNTER — APPOINTMENT (OUTPATIENT)
Dept: NEUROLOGY | Facility: CLINIC | Age: 88
End: 2023-12-06
Payer: MEDICARE

## 2023-12-06 VITALS
HEIGHT: 68 IN | SYSTOLIC BLOOD PRESSURE: 123 MMHG | WEIGHT: 186 LBS | BODY MASS INDEX: 28.19 KG/M2 | OXYGEN SATURATION: 100 % | HEART RATE: 83 BPM | DIASTOLIC BLOOD PRESSURE: 77 MMHG

## 2023-12-06 PROCEDURE — 99214 OFFICE O/P EST MOD 30 MIN: CPT

## 2023-12-06 RX ORDER — OMEPRAZOLE 20 MG/1
20 TABLET, ORALLY DISINTEGRATING, DELAYED RELEASE ORAL
Refills: 0 | Status: ACTIVE | COMMUNITY

## 2023-12-06 RX ORDER — CICLOPIROX OLAMINE 7.7 MG/G
0.77 CREAM TOPICAL
Qty: 90 | Refills: 0 | Status: DISCONTINUED | COMMUNITY
Start: 2022-07-14 | End: 2023-12-06

## 2023-12-06 RX ORDER — CYANOCOBALAMIN (VITAMIN B-12) 3000MCG/ML
3000 DROPS SUBLINGUAL
Refills: 0 | Status: ACTIVE | COMMUNITY

## 2023-12-06 RX ORDER — GLYCERIN ADULT
SUPPOSITORY, RECTAL RECTAL
Refills: 0 | Status: DISCONTINUED | COMMUNITY
End: 2023-12-06

## 2023-12-07 PROBLEM — Z79.01 CURRENT USE OF LONG TERM ANTICOAGULATION: Status: ACTIVE | Noted: 2023-12-07

## 2023-12-28 ENCOUNTER — APPOINTMENT (OUTPATIENT)
Dept: GERIATRICS | Facility: CLINIC | Age: 88
End: 2023-12-28

## 2023-12-29 ENCOUNTER — RX RENEWAL (OUTPATIENT)
Age: 88
End: 2023-12-29

## 2023-12-29 RX ORDER — TAMSULOSIN HYDROCHLORIDE 0.4 MG/1
0.4 CAPSULE ORAL
Qty: 90 | Refills: 3 | Status: ACTIVE | COMMUNITY
Start: 2019-10-08 | End: 1900-01-01

## 2024-01-05 ENCOUNTER — APPOINTMENT (OUTPATIENT)
Dept: NEUROSURGERY | Facility: CLINIC | Age: 89
End: 2024-01-05

## 2024-01-09 ENCOUNTER — APPOINTMENT (OUTPATIENT)
Dept: HEART AND VASCULAR | Facility: CLINIC | Age: 89
End: 2024-01-09
Payer: COMMERCIAL

## 2024-01-09 ENCOUNTER — NON-APPOINTMENT (OUTPATIENT)
Age: 89
End: 2024-01-09

## 2024-01-09 VITALS
WEIGHT: 159 LBS | OXYGEN SATURATION: 99 % | BODY MASS INDEX: 24.18 KG/M2 | HEART RATE: 88 BPM | DIASTOLIC BLOOD PRESSURE: 54 MMHG | SYSTOLIC BLOOD PRESSURE: 106 MMHG

## 2024-01-09 DIAGNOSIS — S06.6XAA TRAUMATIC SUBARACHNOID HEMORRHAGE WITH LOSS OF CONSCIOUSNESS STATUS UNKNOWN, INITIAL ENCOUNTER: ICD-10-CM

## 2024-01-09 DIAGNOSIS — S06.30AA UNSPECIFIED FOCAL TRAUMATIC BRAIN INJURY WITH LOSS OF CONSCIOUSNESS STATUS UNKNOWN, INITIAL ENCOUNTER: ICD-10-CM

## 2024-01-09 DIAGNOSIS — S06.5XAA TRAUMATIC SUBDURAL HEMORRHAGE WITH LOSS OF CONSCIOUSNESS STATUS UNKNOWN, INITIAL ENCOUNTER: ICD-10-CM

## 2024-01-09 DIAGNOSIS — R55 SYNCOPE AND COLLAPSE: ICD-10-CM

## 2024-01-09 DIAGNOSIS — E78.5 HYPERLIPIDEMIA, UNSPECIFIED: ICD-10-CM

## 2024-01-09 PROCEDURE — 93246 EXT ECG>7D<15D RECORDING: CPT

## 2024-01-09 PROCEDURE — 99215 OFFICE O/P EST HI 40 MIN: CPT

## 2024-01-09 PROCEDURE — 93000 ELECTROCARDIOGRAM COMPLETE: CPT | Mod: 59

## 2024-01-09 RX ORDER — ACETAMINOPHEN 500 MG/1
500 TABLET ORAL
Refills: 0 | Status: ACTIVE | COMMUNITY

## 2024-01-09 RX ORDER — AMLODIPINE BESYLATE 2.5 MG/1
2.5 TABLET ORAL DAILY
Qty: 90 | Refills: 3 | Status: DISCONTINUED | COMMUNITY
Start: 2022-06-01 | End: 2024-01-09

## 2024-01-09 RX ORDER — DULAGLUTIDE 1.5 MG/.5ML
1.5 INJECTION, SOLUTION SUBCUTANEOUS
Qty: 3 | Refills: 3 | Status: DISCONTINUED | COMMUNITY
Start: 2019-08-23 | End: 2024-01-09

## 2024-01-09 RX ORDER — METOPROLOL SUCCINATE 25 MG/1
25 TABLET, EXTENDED RELEASE ORAL
Refills: 0 | Status: DISCONTINUED | COMMUNITY
End: 2024-01-09

## 2024-01-09 RX ORDER — METFORMIN HYDROCHLORIDE 500 MG/1
500 TABLET, COATED ORAL
Refills: 0 | Status: ACTIVE | COMMUNITY
Start: 2024-01-09

## 2024-01-09 RX ORDER — RANOLAZINE 500 MG/1
500 TABLET, EXTENDED RELEASE ORAL
Qty: 180 | Refills: 3 | Status: DISCONTINUED | COMMUNITY
Start: 2021-08-31 | End: 2024-01-09

## 2024-01-16 ENCOUNTER — RESULT REVIEW (OUTPATIENT)
Age: 89
End: 2024-01-16

## 2024-01-16 ENCOUNTER — APPOINTMENT (OUTPATIENT)
Dept: NEUROSURGERY | Facility: CLINIC | Age: 89
End: 2024-01-16
Payer: MEDICARE

## 2024-01-16 VITALS
RESPIRATION RATE: 18 BRPM | BODY MASS INDEX: 24.1 KG/M2 | HEART RATE: 88 BPM | DIASTOLIC BLOOD PRESSURE: 71 MMHG | HEIGHT: 68 IN | OXYGEN SATURATION: 99 % | SYSTOLIC BLOOD PRESSURE: 127 MMHG | WEIGHT: 159 LBS

## 2024-01-16 PROCEDURE — 99215 OFFICE O/P EST HI 40 MIN: CPT

## 2024-01-17 NOTE — ASSESSMENT
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 He remains in a rehab facility, and is wearing his collar at all times, except for when he is bathed. He denies any neck, shoulder, or arm pain, other than when he is sometimes repositioning himself. He has been working more with PT and is happy with his physical recovery so far.   He resumed his Eliquis after discussion with his neurologist and tSAH/IVH was near resolution at previous visit.     He had repeat upright X-rays obtained today prior to his appointment and we compared them to his previous x-rays which both continue to show evidence of the fracture. He has maintained alignment, and there is no evidence of splaying of the facet joints posteriorly. He should continue to wear his cervical collar, and I will plan to see him back in six more weeks with repeat upright cervical AP and lateral X-rays to continue to follow the development of the fracture healing process and to ensure he maintains his cervical alignment. He is happy with the plan and all questions were answered before the end of the visit.    I spent 45 minutes relative to this encounter.

## 2024-01-17 NOTE — HISTORY OF PRESENT ILLNESS
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 He no longer has pain, other than when he is repositioning himself at times. He has been participating with PT and wearing his cervical collar including when he is sleeping. He underwent repeat x-rays today. He feels more clear minded, and has been tolerating the collar well. He is happy with the progress he has been making, but does remain at a facility at this time given the amount of care and assistance he still requires. He notes some difficulty with his handwriting, but states that otherwise he has no dexterity difficulties in either hand. He is full strength with no sensory deficits.   12/5/23: JUAN MIGUEL BARTON is a 89-year-old male with a PMH of hypertension, type 2 DM, CAD s/p CABG, hyperlipidemia, Afib on Eliquis (held), and glaucoma who presents to the office today for neurosurgical hospital follow up due to traumatic SDH and C5-C6 fracture.   He was admitted to [facility] after putting something away in his drawer, he lost his balance and fell backwards with head strike. He also suffered a C6 superior endplate fracture and was placed in a collar and given Keppra for seizure prophylaxis. His CT scan revealed SAH with small amount of IVH. He had an acute change in mental status on 11/24 and repeat CT head revealed slightly more blood in bilateral occipital horns and third ventricle with concern for seizure. His Keppra was increased to 750 mg BID. He underwent CT on 11/25 which revealed Minimal residual subarachnoid hemorrhage in the sylvian fissures and intrapeduncular cistern. No change in intraventricular hemorrhage. He was discharged on 11/29/23 to [facility] then transition to hospice (he is not aware of a plan for hospice). Only walking currently with therapy and does not get out of bed without assistance in rehab.  Previously using a walker and cane. He underwent a repeat CT head today, 12/5 which revealed Since 11/24/2023, resolution of the subarachnoid hemorrhage and right intraventricular hemorrhage with trace residual left intraventricular hemorrhage. No new hemorrhage or mass effect (see detailed report below).   Overall, he is doing well except for fatigue from the days events.

## 2024-01-17 NOTE — DATA REVIEWED
[de-identified] :  Exam Date:      12/05/23 Exam:         CT HEAD-NON STROKE Order#:       [id number]    Clinical history/reason for exam: Follow-up subarachnoid hemorrhage and intraventricular hemorrhage   Technique: Multiple contiguous axial CT images of the head were obtained from the base of the skull to the vertex without the administration of IV contrast. Coronal and sagittal reformatted images were obtained.   Comparison:CT head 11/24/2023   Findings:   There has been interval resolution of the subarachnoid hemorrhage. There has been interval resolution of the intraventricular hemorrhage in the right occipital horn with trace intraventricular hemorrhage in the left occipital horn. There is no new intracranial hemorrhage or extra-axial fluid collection. There is no mass effect or midline shift.   There is no hydrocephalus..   There is no interval demarcated territorial infarction..   The bones of the calvarium are intact.   There are no air-fluid levels in the paranasal sinuses. The right mastoid tip a sclerotic.   Impression:   Since 11/24/2023, resolution of the subarachnoid hemorrhage and right intraventricular hemorrhage with trace residual left intraventricular hemorrhage. No new hemorrhage or mass effect.				 Exam Date: 	  11/24/23					 Exam: 	CT HEAD-NON STROKE					 Order#:	[id number]					                [facility] PRELIMINARY REPORT  PROCEDURE INFORMATION:  Exam: CT Head Without Contrast  Exam date and time: 11/24/2023 7:48 PM  Age: 89 years old  Clinical indication: Fu stroke code, AMS, follow up subarachnoid hemorrhage   TECHNIQUE:  Imaging protocol: Computed tomography of the head without contrast.   COMPARISON:  CT ANGIO HEAD STROKE PROTOCOL 11/24/2023 1:06 PM   FINDINGS:  Brain: There is age-appropriate volume loss. There is minimal bilateral subarachnoid  hemorrhage in the sylvian fissures posteriorly. Small amount of hemorrhage in the interpeduncular cistern. Brain parenchyma: No significant change from prior scan. No parenchymal hemorrhage identified. No  extra-axial hematoma. No acute infarct.  Cerebral ventricles: There is intraventricular hemorrhage present in the  occipital horns. No change from prior scan. Ventricular size is stable. Mild dilatation of the temporal horns. Paranasal sinuses: Visualized sinuses are unremarkable. No fluid levels.  Mastoid air cells: Visualized mastoid air cells are well aerated.  Bones/joints: Unremarkable. No acute fracture.  Soft tissues: Unremarkable.    IMPRESSION:  1.   Minimal residual subarachnoid hemorrhage in the sylvian fissures and intrapeduncular cistern.  2.  No change in intraventricular hemorrhage.  				 Exam Date: 	  11/24/23					 Exam: 	CT HEAD-CODE STROKE					 Order#:	CT [id number]					                EXAM: CT HEAD WITHOUT INTRAVENOUS CONTRAST  HISTORY: Subarachnoid hemorrhage, stroke code  TECHNIQUE: Multiple axial images were obtained from the skull base to the vertex. Sagittal and coronal reformatted images were obtained from the axial data set. The images were reviewed in brain and bone windows.  COMPARISON: CT of the head November 19, 2023  FINDINGS:   Trace residual subarachnoid blood in the bilateral sylvian fissures. Redemonstrated layering of intraventricular blood in the occipital horns, increased when compared to prior imaging.  No acute intraparenchymal hemorrhage. Small areas of decreased attenuation in the deep and periventricular white matter, compatible with chronic small vessel disease. Central parenchymal volume loss. No significant midline shift.  The cranial cervical junction is within normal limits. The sella is not expanded. No depressed calvarial fracture. The visualized paranasal sinuses are well aerated. Probable nasal polyp in the left nasal cavity. The visualized mastoid air cells are well aerated. The visualized orbits are status post cataract surgery.  IMPRESSION:   1.  Layering of intraventricular blood in the occipital horns, increased when compared to prior imaging. 2.  Improved previously visualized subarachnoid blood. 					 Exam Date: 	  11/19/23					 Exam: 	CT HEAD-NON STROKE					 Order#:	[id number]					                CT BRAIN WITHOUT CONTRAST  INDICATIONS:  Follow-up traumatic subarachnoid hemorrhage.  TECHNIQUE:  Serial axial images were obtained from the skull base to the vertex without the use of contrast.    COMPARISON EXAM: 11/18/2023.  FINDINGS: Right frontal, right maxillary region, and left temporal parietal extracalvarial hematomas. No fracture of the underlying calvarium. Ventricles and sulci: Parenchymal volume loss is present which is commensurate with patient age.  Intra-axial: No intraparenchymal mass lesions, significant mass effect or midline shift. Extra-axial: Presence of subarachnoid hemorrhage is appreciated within the region of the bilateral sylvian fissures, as well as in vermin region subarachnoid spaces. Layering of hemorrhage is also appreciated within the occipital horns of the bilateral lateral ventricles. No hydrocephalus.  Calvarium: Intact.  Bilateral optic globes are intact. Imaged paranasal sinuses, bilateral mastoid air cells, middle ear cavities are clear.   IMPRESSION:Presence of subarachnoid hemorrhage is appreciated within the region of the bilateral sylvian fissures, as well as in vermin region subarachnoid spaces. Layering of hemorrhage is also appreciated within the occipital horns of the bilateral lateral ventricles. No hydrocephalus. Follow-up to resolution. [de-identified] : 				 Exam Date: 	  11/26/23					 Exam: 	MRI BRAIN WAW IC					 Order#:	[id number]					                Exam: MR of the brain without contrast  CLINICAL INDICATION: 89-year-old male with intracranial hemorrhage; nonresponsive  COMPARISON: Outside CT of the head from 2 days earlier  TECHNIQUE: Multiplanar multisequence images through the brain before and after intravenous administration of 7.5 cc of gadolinium; 0 cc were discarded  FINDINGS: Blood fluid levels within the occipital horns. Dilatation of the supratentorial ventricular system unchanged from the previous CT with mild asymmetric enlargement of the right temporal horn. Scattered areas of hemosiderin deposition or arachnoid hemorrhage over the left parietal convex city. No appreciable vascular malformation or tumor. No restricted diffusion to suggest an acute ischemic infarct  Generalized cerebral atrophy with multifocal areas of the supratentorial white matter gliosis consistent with chronic atherosclerotic small vessel disease. Thin right frontoparietal convexity subdural effusion visible on the T2 FLAIR images. The T2 FLAIR images were done with fat suppression technique resulting in visualization of edema within the left parietal extracranial soft tissues.  No pathologic leptomeningeal or intra-axial parenchymal enhancement. Patent dural venous sinuses.  Preservation of basal cisterns. No tonsillar ectopia.   IMPRESSION: Left parietal soft tissue swelling with underlying extracerebral hemorrhage and blood fluid levels within the occipital horns. Intracranial hemorrhage may relate to relatively minor degree of closed head injury.  No mass or vascular malformation

## 2024-01-18 ENCOUNTER — APPOINTMENT (OUTPATIENT)
Dept: ENDOCRINOLOGY | Facility: CLINIC | Age: 89
End: 2024-01-18

## 2024-01-29 ENCOUNTER — RX RENEWAL (OUTPATIENT)
Age: 89
End: 2024-01-29

## 2024-02-02 PROCEDURE — 93248 EXT ECG>7D<15D REV&INTERPJ: CPT

## 2024-02-13 NOTE — HISTORY OF PRESENT ILLNESS
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 review of chart shows creatinine 1.4 in 2013.\par \par  Seems to fluctuate between 1.1 to 1.4 since 2013.\par  Is taking losartan. UA showed only trace protein. \par \par States he was hospitalized in 7/2017 with SBP > 230.        \par \par  of note has vein harvested on L leg, so w/o diuretic exp edema -.
[FreeTextEntry2] : bilateral knee pain

## 2024-02-20 NOTE — PHYSICAL EXAM
[General Appearance - Alert] : alert [General Appearance - In No Acute Distress] : in no acute distress [Fluency] : fluency intact [Comprehension] : comprehension intact [Cranial Nerves Optic (II)] : visual acuity intact bilaterally,  pupils equal round and reactive to light [Cranial Nerves Oculomotor (III)] : extraocular motion intact [Cranial Nerves Trigeminal (V)] : facial sensation intact symmetrically [Cranial Nerves Facial (VII)] : face symmetrical [Cranial Nerves Vestibulocochlear (VIII)] : hearing was intact bilaterally [Cranial Nerves Glossopharyngeal (IX)] : tongue and palate midline [Cranial Nerves Accessory (XI - Cranial And Spinal)] : head turning and shoulder shrug symmetric [Cranial Nerves Hypoglossal (XII)] : there was no tongue deviation with protrusion [Motor Strength] : muscle strength was normal in all four extremities [Sensation Tactile Decrease] : light touch was intact [Abnormal Walk] : normal gait [2+] : Ankle jerk left 2+

## 2024-02-27 ENCOUNTER — RESULT REVIEW (OUTPATIENT)
Age: 89
End: 2024-02-27

## 2024-02-27 ENCOUNTER — APPOINTMENT (OUTPATIENT)
Dept: NEUROSURGERY | Facility: CLINIC | Age: 89
End: 2024-02-27
Payer: MEDICARE

## 2024-02-27 VITALS
SYSTOLIC BLOOD PRESSURE: 134 MMHG | HEART RATE: 75 BPM | OXYGEN SATURATION: 98 % | WEIGHT: 160 LBS | DIASTOLIC BLOOD PRESSURE: 75 MMHG | BODY MASS INDEX: 24.25 KG/M2 | HEIGHT: 68 IN

## 2024-02-27 DIAGNOSIS — M48.10 ANKYLOSING HYPEROSTOSIS [FORESTIER], SITE UNSPECIFIED: ICD-10-CM

## 2024-02-27 PROCEDURE — 99215 OFFICE O/P EST HI 40 MIN: CPT

## 2024-02-27 NOTE — HISTORY OF PRESENT ILLNESS
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 He underwent repeat x-rays today.  He is in rehab at [facility]. He has been wearing his cervical collar. Denies neck pain. He has been incrementally increasing his activity.  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 He no longer has pain, other than when he is repositioning himself at times. He has been participating with PT and wearing his cervical collar including when he is sleeping. He underwent repeat x-rays today. He feels more clear minded, and has been tolerating the collar well. He is happy with the progress he has been making, but does remain at a facility at this time given the amount of care and assistance he still requires. He notes some difficulty with his handwriting, but states that otherwise he has no dexterity difficulties in either hand. He is full strength with no sensory deficits.   12/5/23: JUAN MIGUEL ANIRUDHIN is a 89-year-old male with a PMH of hypertension, type 2 DM, CAD s/p CABG, hyperlipidemia, Afib on Eliquis (held), and glaucoma who presents to the office today for neurosurgical hospital follow up due to traumatic SDH and C5-C6 fracture.   He was admitted to [facility] after putting something away in his drawer, he lost his balance and fell backwards with head strike. He also suffered a C6 superior endplate fracture and was placed in a collar and given Keppra for seizure prophylaxis. His CT scan revealed SAH with small amount of IVH. He had an acute change in mental status on 11/24 and repeat CT head revealed slightly more blood in bilateral occipital horns and third ventricle with concern for seizure. His Keppra was increased to 750 mg BID. He underwent CT on 11/25 which revealed Minimal residual subarachnoid hemorrhage in the sylvian fissures and intrapeduncular cistern. No change in intraventricular hemorrhage. He was discharged on 11/29/23 to [facility] then transition to hospice (he is not aware of a plan for hospice). Only walking currently with therapy and does not get out of bed without assistance in rehab.  Previously using a walker and cane. He underwent a repeat CT head today, 12/5 which revealed Since 11/24/2023, resolution of the subarachnoid hemorrhage and right intraventricular hemorrhage with trace residual left intraventricular hemorrhage. No new hemorrhage or mass effect (see detailed report below).   Overall, he is doing well except for fatigue from the days events.

## 2024-02-27 NOTE — DATA REVIEWED
[de-identified] :  Exam Date:      12/05/23 Exam:         CT HEAD-NON STROKE Order#:       [id number]    Clinical history/reason for exam: Follow-up subarachnoid hemorrhage and intraventricular hemorrhage   Technique: Multiple contiguous axial CT images of the head were obtained from the base of the skull to the vertex without the administration of IV contrast. Coronal and sagittal reformatted images were obtained.   Comparison:CT head 11/24/2023   Findings:   There has been interval resolution of the subarachnoid hemorrhage. There has been interval resolution of the intraventricular hemorrhage in the right occipital horn with trace intraventricular hemorrhage in the left occipital horn. There is no new intracranial hemorrhage or extra-axial fluid collection. There is no mass effect or midline shift.   There is no hydrocephalus..   There is no interval demarcated territorial infarction..   The bones of the calvarium are intact.   There are no air-fluid levels in the paranasal sinuses. The right mastoid tip a sclerotic.   Impression:   Since 11/24/2023, resolution of the subarachnoid hemorrhage and right intraventricular hemorrhage with trace residual left intraventricular hemorrhage. No new hemorrhage or mass effect.				 Exam Date: 	  11/24/23					 Exam: 	CT HEAD-NON STROKE					 Order#:	[id number]					                [facility] RADIOLOGIST PRELIMINARY REPORT  PROCEDURE INFORMATION:  Exam: CT Head Without Contrast  Exam date and time: 11/24/2023 7:48 PM  Age: 89 years old  Clinical indication: Fu stroke code, AMS, follow up subarachnoid hemorrhage   TECHNIQUE:  Imaging protocol: Computed tomography of the head without contrast.   COMPARISON:  CT ANGIO HEAD STROKE PROTOCOL 11/24/2023 1:06 PM   FINDINGS:  Brain: There is age-appropriate volume loss. There is minimal bilateral subarachnoid  hemorrhage in the sylvian fissures posteriorly. Small amount of hemorrhage in the interpeduncular cistern. Brain parenchyma: No significant change from prior scan. No parenchymal hemorrhage identified. No  extra-axial hematoma. No acute infarct.  Cerebral ventricles: There is intraventricular hemorrhage present in the  occipital horns. No change from prior scan. Ventricular size is stable. Mild dilatation of the temporal horns. Paranasal sinuses: Visualized sinuses are unremarkable. No fluid levels.  Mastoid air cells: Visualized mastoid air cells are well aerated.  Bones/joints: Unremarkable. No acute fracture.  Soft tissues: Unremarkable.    IMPRESSION:  1.   Minimal residual subarachnoid hemorrhage in the sylvian fissures and intrapeduncular cistern.  2.  No change in intraventricular hemorrhage.  				 Exam Date: 	  11/24/23					 Exam: 	CT HEAD-CODE STROKE					 Order#:	[id number]					                EXAM: CT HEAD WITHOUT INTRAVENOUS CONTRAST  HISTORY: Subarachnoid hemorrhage, stroke code  TECHNIQUE: Multiple axial images were obtained from the skull base to the vertex. Sagittal and coronal reformatted images were obtained from the axial data set. The images were reviewed in brain and bone windows.  COMPARISON: CT of the head November 19, 2023  FINDINGS:   Trace residual subarachnoid blood in the bilateral sylvian fissures. Redemonstrated layering of intraventricular blood in the occipital horns, increased when compared to prior imaging.  No acute intraparenchymal hemorrhage. Small areas of decreased attenuation in the deep and periventricular white matter, compatible with chronic small vessel disease. Central parenchymal volume loss. No significant midline shift.  The cranial cervical junction is within normal limits. The sella is not expanded. No depressed calvarial fracture. The visualized paranasal sinuses are well aerated. Probable nasal polyp in the left nasal cavity. The visualized mastoid air cells are well aerated. The visualized orbits are status post cataract surgery.  IMPRESSION:   1.  Layering of intraventricular blood in the occipital horns, increased when compared to prior imaging. 2.  Improved previously visualized subarachnoid blood. 					 Exam Date: 	  11/19/23					 Exam: 	CT HEAD-NON STROKE					 Order#:	[id number]					                CT BRAIN WITHOUT CONTRAST  INDICATIONS:  Follow-up traumatic subarachnoid hemorrhage.  TECHNIQUE:  Serial axial images were obtained from the skull base to the vertex without the use of contrast.    COMPARISON EXAM: 11/18/2023.  FINDINGS: Right frontal, right maxillary region, and left temporal parietal extracalvarial hematomas. No fracture of the underlying calvarium. Ventricles and sulci: Parenchymal volume loss is present which is commensurate with patient age.  Intra-axial: No intraparenchymal mass lesions, significant mass effect or midline shift. Extra-axial: Presence of subarachnoid hemorrhage is appreciated within the region of the bilateral sylvian fissures, as well as in vermin region subarachnoid spaces. Layering of hemorrhage is also appreciated within the occipital horns of the bilateral lateral ventricles. No hydrocephalus.  Calvarium: Intact.  Bilateral optic globes are intact. Imaged paranasal sinuses, bilateral mastoid air cells, middle ear cavities are clear.   IMPRESSION:Presence of subarachnoid hemorrhage is appreciated within the region of the bilateral sylvian fissures, as well as in vermin region subarachnoid spaces. Layering of hemorrhage is also appreciated within the occipital horns of the bilateral lateral ventricles. No hydrocephalus. Follow-up to resolution. [de-identified] : 				 Exam Date: 	  11/26/23					 Exam: 	MRI BRAIN WAW IC					 Order#:	[id number]					                Exam: MR of the brain without contrast  CLINICAL INDICATION: 89-year-old male with intracranial hemorrhage; nonresponsive  COMPARISON: Outside CT of the head from 2 days earlier  TECHNIQUE: Multiplanar multisequence images through the brain before and after intravenous administration of 7.5 cc of gadolinium; 0 cc were discarded  FINDINGS: Blood fluid levels within the occipital horns. Dilatation of the supratentorial ventricular system unchanged from the previous CT with mild asymmetric enlargement of the right temporal horn. Scattered areas of hemosiderin deposition or arachnoid hemorrhage over the left parietal convex city. No appreciable vascular malformation or tumor. No restricted diffusion to suggest an acute ischemic infarct  Generalized cerebral atrophy with multifocal areas of the supratentorial white matter gliosis consistent with chronic atherosclerotic small vessel disease. Thin right frontoparietal convexity subdural effusion visible on the T2 FLAIR images. The T2 FLAIR images were done with fat suppression technique resulting in visualization of edema within the left parietal extracranial soft tissues.  No pathologic leptomeningeal or intra-axial parenchymal enhancement. Patent dural venous sinuses.  Preservation of basal cisterns. No tonsillar ectopia.   IMPRESSION: Left parietal soft tissue swelling with underlying extracerebral hemorrhage and blood fluid levels within the occipital horns. Intracranial hemorrhage may relate to relatively minor degree of closed head injury.  No mass or vascular malformation

## 2024-02-27 NOTE — ASSESSMENT
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 He remains in a rehab facility, and is wearing his collar at all times, except for when he is bathed. He denies any neck, shoulder, or arm pain, other than when he is sometimes repositioning himself. He continues to progress with PT and is now in rehab at [facility].   He resumed his Eliquis after discussion with his neurologist and tSAH/IVH was near resolution at previous visit.     He had repeat upright X-rays obtained today prior to his appointment and we compared them to his previous x-rays which shows well maintained cervical vertebral body height and alignment. The fracture line is still visible, but there is ossification that is visualized.There is no evidence of splaying of the facet joints posteriorly.   At this time there is no need to continue wearing his collar, and it was removed today in the office. I had him flex, extend, and rotate his head, all of which generated no pain or discomfort. I explained that the fracture is still healing, but given all of the clinical and radiographic findings, there is no need to continue wearing the collar. He should wear his soft collar when he is working with PT.   Otherwise he is doing great and there is no need to schedule another appointment at this time. If anything is to arise, he should reach out and we will see him again. All of his questions were answered prior to the end of the appointment.     I spent 45 minutes relative to this encounter.

## 2024-02-29 ENCOUNTER — APPOINTMENT (OUTPATIENT)
Dept: CARDIOLOGY | Facility: CLINIC | Age: 89
End: 2024-02-29
Payer: MEDICARE

## 2024-02-29 PROCEDURE — 93306 TTE W/DOPPLER COMPLETE: CPT

## 2024-03-04 ENCOUNTER — RESULT REVIEW (OUTPATIENT)
Age: 89
End: 2024-03-04

## 2024-03-05 ENCOUNTER — APPOINTMENT (OUTPATIENT)
Dept: HEART AND VASCULAR | Facility: CLINIC | Age: 89
End: 2024-03-05

## 2024-03-06 ENCOUNTER — TRANSCRIPTION ENCOUNTER (OUTPATIENT)
Age: 89
End: 2024-03-06

## 2024-03-13 ENCOUNTER — APPOINTMENT (OUTPATIENT)
Dept: NEPHROLOGY | Facility: CLINIC | Age: 89
End: 2024-03-13
Payer: MEDICARE

## 2024-03-13 VITALS
BODY MASS INDEX: 25.31 KG/M2 | WEIGHT: 167 LBS | OXYGEN SATURATION: 100 % | HEART RATE: 63 BPM | SYSTOLIC BLOOD PRESSURE: 132 MMHG | HEIGHT: 68 IN | DIASTOLIC BLOOD PRESSURE: 70 MMHG

## 2024-03-13 PROCEDURE — 99214 OFFICE O/P EST MOD 30 MIN: CPT

## 2024-03-13 RX ORDER — APIXABAN 2.5 MG/1
2.5 TABLET, FILM COATED ORAL
Refills: 0 | Status: ACTIVE | COMMUNITY

## 2024-03-13 RX ORDER — GLIPIZIDE 2.5 MG/1
2.5 TABLET, EXTENDED RELEASE ORAL
Qty: 90 | Refills: 3 | Status: DISCONTINUED | COMMUNITY
Start: 2021-07-19 | End: 2024-03-13

## 2024-03-13 NOTE — HISTORY OF PRESENT ILLNESS
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

## 2024-03-13 NOTE — ASSESSMENT
[FreeTextEntry1] : CKD 3 - renal fx markedly improved, likely 2/2 weightloss ( ~25+ lbs), ggfr 45-5-, cr 1.3 ( from 1.9), at some point will consider resuming farxiga - HTN - cont MTP and MTP Afib - followed by Dr. Mariscal,  review whether to ncrease eliquis to 5bid as renal fx has improved - will cc Cardiologist Hyperlipidemia - cont statin  - neurosurgery note reveiwed - cardiology note reviewed - endo note reviewed    Hgb a1c reviewed - 6.6 lipid reviewed BMET reviewed CBC reviewed    cc Endo and Cadiology.

## 2024-03-21 ENCOUNTER — APPOINTMENT (OUTPATIENT)
Dept: HEART AND VASCULAR | Facility: CLINIC | Age: 89
End: 2024-03-21
Payer: MEDICARE

## 2024-03-21 ENCOUNTER — NON-APPOINTMENT (OUTPATIENT)
Age: 89
End: 2024-03-21

## 2024-03-21 VITALS
OXYGEN SATURATION: 99 % | SYSTOLIC BLOOD PRESSURE: 130 MMHG | HEART RATE: 84 BPM | DIASTOLIC BLOOD PRESSURE: 60 MMHG | HEIGHT: 68 IN | BODY MASS INDEX: 24.25 KG/M2 | WEIGHT: 160 LBS

## 2024-03-21 DIAGNOSIS — I25.810 ATHEROSCLEROSIS OF CORONARY ARTERY BYPASS GRAFT(S) W/OUT ANGINA PECTORIS: ICD-10-CM

## 2024-03-21 PROCEDURE — 99214 OFFICE O/P EST MOD 30 MIN: CPT

## 2024-03-21 PROCEDURE — 93000 ELECTROCARDIOGRAM COMPLETE: CPT

## 2024-03-21 RX ORDER — DOCUSATE SODIUM 100 MG/1
100 CAPSULE, LIQUID FILLED ORAL
Refills: 0 | Status: DISCONTINUED | COMMUNITY
End: 2024-03-21

## 2024-03-21 RX ORDER — DAPAGLIFLOZIN 10 MG/1
10 TABLET, FILM COATED ORAL
Qty: 90 | Refills: 1 | Status: DISCONTINUED | COMMUNITY
End: 2024-03-21

## 2024-03-21 RX ORDER — METFORMIN ER 500 MG 500 MG/1
500 TABLET ORAL
Qty: 180 | Refills: 3 | Status: DISCONTINUED | COMMUNITY
Start: 2020-01-24 | End: 2024-03-21

## 2024-03-21 RX ORDER — LEVETIRACETAM 750 MG/1
750 TABLET, FILM COATED ORAL
Refills: 0 | Status: DISCONTINUED | COMMUNITY
End: 2024-03-21

## 2024-03-21 NOTE — ASSESSMENT
[FreeTextEntry1] : 89 y/o M patient of Dr. Sprague.  Here to transition care.  Hx of afib eliquis 2.5mg BID, CAD s/p CABG 2014 at [facility] (no further interventions) with recent intracranial bleeding s/p fall Nov 2023.   EKG sinus rhythm first-degree AV block right bundle branch block left anterior hemiblock. ECHO Nov 2023 - normal LVEF, moderate TR, PASP 52mmHg. Prior consultations/imaging/hospitalizations reviewed.   - recent hospitalization records/imaging reviewed.  Medical mgmt of CAD.  Patent KATZ to LAD, art graft to D1, and art graft to OM2.  SVG to RCA occluded.  - eliquis 2.5mg BID to preven - 14 day external holter monitor no significant arrhythmias, brief SVT noted.   - continue rest of meds, lipitor 40mg, toprol er 50mg, [id number] BID, PRN SL nitro, norvasc 5mg

## 2024-03-21 NOTE — HISTORY OF PRESENT ILLNESS
[FreeTextEntry1] : 89 y/o M patient of Dr. Sprague.  Here to transition care.  Hx of afib eliquis 2.5mg BID, CAD s/p CABG 2014 at [facility] (no further interventions) with recent intracranial bleeding s/p fall Nov 2023.   Recent fall at home late November 2023 (unwitnessed) and found to have cervical neck fracture and intracranial bleeding which was managed conservatively.  ECHO unremarkable.  Hospitalized at [place] 11/18-11/29/23.  Restarted eliquis in early December by neurology.   he has no recollection of the fall and noone was there to witness it.  He has fallen previously 2-3 times in the last year.    Had a major fall 5 years ago where he broke a cervical vertebrae.  Patient feels like he is getting better, more clear headed, can read the newspaper, walking with aid of walker, participating in physical therapy at rehab center.    His CAD is stable, prior to fall was exercising, used rollator or cane.  occasionally would take SL nitro with relief of angina.  He has no complaints of chest pain or sob currently.  No palpitations.  No leg edema.    3/21/24:  was admitted to [facility] 3 weeks ago with chest pain, mildly elevated troponins.  ECHO wnl.  Cor CTA with patent LIMA and art grafts to D1 and OM2, occluded SVG to RCA.  He is feeling well, out of rehab, no chest pain.  Has not used SL nitro

## 2024-03-22 ENCOUNTER — APPOINTMENT (OUTPATIENT)
Dept: GERIATRICS | Facility: CLINIC | Age: 89
End: 2024-03-22
Payer: MEDICARE

## 2024-03-22 ENCOUNTER — NON-APPOINTMENT (OUTPATIENT)
Age: 89
End: 2024-03-22

## 2024-03-22 VITALS
HEART RATE: 68 BPM | SYSTOLIC BLOOD PRESSURE: 138 MMHG | RESPIRATION RATE: 20 BRPM | OXYGEN SATURATION: 99 % | DIASTOLIC BLOOD PRESSURE: 62 MMHG | TEMPERATURE: 97.8 F

## 2024-03-22 DIAGNOSIS — K21.9 GASTRO-ESOPHAGEAL REFLUX DISEASE W/OUT ESOPHAGITIS: ICD-10-CM

## 2024-03-22 PROCEDURE — 99496 TRANSJ CARE MGMT HIGH F2F 7D: CPT

## 2024-03-22 PROCEDURE — G0444 DEPRESSION SCREEN ANNUAL: CPT

## 2024-03-22 RX ORDER — LIDOCAINE 40 MG/G
4 PATCH TOPICAL
Refills: 0 | Status: DISCONTINUED | COMMUNITY
End: 2024-03-22

## 2024-03-22 RX ORDER — LORATADINE 5 MG
TABLET,CHEWABLE ORAL
Refills: 0 | Status: DISCONTINUED | COMMUNITY

## 2024-03-22 RX ORDER — MECLIZINE HYDROCHLORIDE 25 MG/1
25 TABLET ORAL
Refills: 0 | Status: COMPLETED | COMMUNITY
End: 2024-03-22

## 2024-03-22 RX ORDER — METOPROLOL SUCCINATE 50 MG/1
50 TABLET, EXTENDED RELEASE ORAL
Qty: 90 | Refills: 0 | Status: ACTIVE | COMMUNITY
Start: 2024-03-22

## 2024-03-22 RX ORDER — GLUCOSAMINE HCL/CHONDROITIN SU 500-400 MG
3 CAPSULE ORAL
Refills: 0 | Status: ACTIVE | COMMUNITY

## 2024-03-22 NOTE — HISTORY OF PRESENT ILLNESS
[Admitted on: ___] : The patient was admitted on [unfilled] [Discharge Summary] : discharge summary [Discharged on ___] : discharged on [unfilled] [Pertinent Labs] : pertinent labs [Radiology Findings] : radiology findings [Discharge Med List] : discharge medication list [Med Reconciliation] : medication reconciliation has been completed [Patient Contacted By: ____] : and contacted by [unfilled] [FreeTextEntry2] : Pt was admitted to skilled nursing unit for skilled observation and rehab after severe fall with injury including spinal fracture and subdural hematoma. He slowly improved with nursing attention and intensive therapy and was discharged home on 3/20.

## 2024-03-22 NOTE — HEALTH RISK ASSESSMENT
[Any fall with injury in past year] : Patient reported fall with injury in the past year [0] : 2) Feeling down, depressed, or hopeless: Not at all (0) [[id number]] : 0

## 2024-04-02 ENCOUNTER — APPOINTMENT (OUTPATIENT)
Dept: GERIATRICS | Facility: CLINIC | Age: 89
End: 2024-04-02
Payer: MEDICARE

## 2024-04-02 VITALS
OXYGEN SATURATION: 94 % | SYSTOLIC BLOOD PRESSURE: 110 MMHG | WEIGHT: 176 LBS | HEART RATE: 76 BPM | BODY MASS INDEX: 26.67 KG/M2 | DIASTOLIC BLOOD PRESSURE: 62 MMHG | HEIGHT: 68 IN | TEMPERATURE: 97 F

## 2024-04-02 DIAGNOSIS — Z87.81 PERSONAL HISTORY OF (HEALED) TRAUMATIC FRACTURE: ICD-10-CM

## 2024-04-02 DIAGNOSIS — S12.500A UNSPECIFIED DISPLACED FRACTURE OF SIXTH CERVICAL VERTEBRA, INITIAL ENCOUNTER FOR CLOSED FRACTURE: ICD-10-CM

## 2024-04-02 DIAGNOSIS — E11.3293 TYPE 2 DIABETES MELLITUS WITH MILD NONPROLIFERATIVE DIABETIC RETINOPATHY WITHOUT MACULAR EDEMA, BILATERAL: ICD-10-CM

## 2024-04-02 PROCEDURE — 99214 OFFICE O/P EST MOD 30 MIN: CPT

## 2024-04-02 PROCEDURE — G2211 COMPLEX E/M VISIT ADD ON: CPT

## 2024-04-02 RX ORDER — LORATADINE 5 MG
17 TABLET,CHEWABLE ORAL
Refills: 0 | Status: ACTIVE | COMMUNITY

## 2024-04-02 NOTE — HISTORY OF PRESENT ILLNESS
[Any fall with injury in past year] : Patient reported fall with injury in the past year [Walker] : walker [0] : 2) Feeling down, depressed, or hopeless: Not at all (0) [FreeTextEntry1] :  Pt was admitted to skilled nursing unit for skilled observation and rehab after severe fall with injury including spinal fracture and subdural hematoma. He slowly improved with nursing attention and intensive therapy and was discharged home on 3/20.  Yesterday he had a trip and fall in his apartment, thought to be related to a throw rug which has since been removed. He sustained a laceration to head and had 3 staples placed in ER yesterday.  He denies any pain or dizziness.  He has lost weight at [facility] and did have his long time partner pass relatively recently.  He is no longer on farxiga, trulicity or glipizide.    Due for labs  [Completely Independent] : Completely independent. [Smoke Detector] : smoke detector [Carbon Monoxide Detector] : carbon monoxide detector [PHQ-2 Negative - No further assessment needed] : PHQ-2 Negative - No further assessment needed [[id number]] : 0

## 2024-04-02 NOTE — PHYSICAL EXAM
[General Appearance - Alert] : alert [General Appearance - In No Acute Distress] : in no acute distress [General Appearance - Well Nourished] : well nourished [General Appearance - Well Developed] : well developed [Normal Oral Mucosa] : normal oral mucosa [No Oral Pallor] : no oral pallor [Neck Appearance] : the appearance of the neck was normal [] : no respiratory distress [Exaggerated Use Of Accessory Muscles For Inspiration] : no accessory muscle use [Respiration, Rhythm And Depth] : normal respiratory rhythm and effort [Bowel Sounds] : normal bowel sounds [Abdomen Soft] : soft [Abdomen Tenderness] : non-tender [Cervical Lymph Nodes Enlarged Posterior Bilaterally] : posterior cervical [Cervical Lymph Nodes Enlarged Anterior Bilaterally] : anterior cervical [Supraclavicular Lymph Nodes Enlarged Bilaterally] : supraclavicular [No CVA Tenderness] : no ~M costovertebral angle tenderness [Abnormal Walk] : normal gait

## 2024-04-02 NOTE — ASSESSMENT
[FreeTextEntry1] : labs to be done at [facility] will return next week for staple removal 3 staples on top of head  goal is to maintain weight loss and stay off of drugs as possible at this time remains only on Metformin for DM

## 2024-04-03 ENCOUNTER — LABORATORY RESULT (OUTPATIENT)
Age: 89
End: 2024-04-03

## 2024-04-11 ENCOUNTER — APPOINTMENT (OUTPATIENT)
Dept: GERIATRICS | Facility: CLINIC | Age: 89
End: 2024-04-11
Payer: MEDICARE

## 2024-04-11 VITALS
HEART RATE: 79 BPM | OXYGEN SATURATION: 99 % | DIASTOLIC BLOOD PRESSURE: 48 MMHG | BODY MASS INDEX: 26.9 KG/M2 | WEIGHT: 176.9 LBS | SYSTOLIC BLOOD PRESSURE: 114 MMHG | TEMPERATURE: 98.7 F

## 2024-04-11 DIAGNOSIS — E53.8 DEFICIENCY OF OTHER SPECIFIED B GROUP VITAMINS: ICD-10-CM

## 2024-04-11 DIAGNOSIS — R97.20 ELEVATED PROSTATE, SPECIFIC ANTIGEN [PSA]: ICD-10-CM

## 2024-04-11 PROCEDURE — G2211 COMPLEX E/M VISIT ADD ON: CPT

## 2024-04-11 PROCEDURE — 99214 OFFICE O/P EST MOD 30 MIN: CPT

## 2024-04-11 NOTE — ASSESSMENT
[FreeTextEntry1] : 3 staples removed today without incident okay to use mild shampoo ordered labs to be done at [facility] just prior to next visit reviewed labs - PSA rising, A1C rising must watch diet very likely may need to resume Trulicity if weight continues to rise on this trend

## 2024-04-11 NOTE — HISTORY OF PRESENT ILLNESS
[Any fall with injury in past year] : Patient reported fall with injury in the past year [0] : 2) Feeling down, depressed, or hopeless: Not at all (0) [FreeTextEntry1] :  Pt was admitted to skilled nursing unit for skilled observation and rehab after severe fall with injury including spinal fracture and subdural hematoma. He slowly improved with nursing attention and intensive therapy and was discharged home on 3/20.  After returning home from [facility], He tripped over a rug and sustained a laceration to head and had 3 staples placed in ER   He is here for removal and to review labs [Completely Independent] : Completely independent. [Walker] : walker [Smoke Detector] : smoke detector [Carbon Monoxide Detector] : carbon monoxide detector [PHQ-2 Negative - No further assessment needed] : PHQ-2 Negative - No further assessment needed [[id number]] : 0

## 2024-04-11 NOTE — PHYSICAL EXAM
[General Appearance - Alert] : alert [General Appearance - In No Acute Distress] : in no acute distress [General Appearance - Well Nourished] : well nourished [General Appearance - Well Developed] : well developed [Normal Oral Mucosa] : normal oral mucosa [No Oral Pallor] : no oral pallor [Neck Appearance] : the appearance of the neck was normal [] : no respiratory distress [Respiration, Rhythm And Depth] : normal respiratory rhythm and effort [Exaggerated Use Of Accessory Muscles For Inspiration] : no accessory muscle use [Bowel Sounds] : normal bowel sounds [Abdomen Soft] : soft [Abdomen Tenderness] : non-tender [Cervical Lymph Nodes Enlarged Posterior Bilaterally] : posterior cervical [Cervical Lymph Nodes Enlarged Anterior Bilaterally] : anterior cervical [Supraclavicular Lymph Nodes Enlarged Bilaterally] : supraclavicular [No CVA Tenderness] : no ~M costovertebral angle tenderness [Abnormal Walk] : normal gait

## 2024-04-29 ENCOUNTER — APPOINTMENT (OUTPATIENT)
Dept: ENDOCRINOLOGY | Facility: CLINIC | Age: 89
End: 2024-04-29
Payer: MEDICARE

## 2024-04-29 PROCEDURE — ZZZZZ: CPT

## 2024-05-01 ENCOUNTER — RX RENEWAL (OUTPATIENT)
Age: 89
End: 2024-05-01

## 2024-05-01 RX ORDER — METOPROLOL TARTRATE 50 MG/1
50 TABLET, FILM COATED ORAL DAILY
Refills: 0 | Status: DISCONTINUED | COMMUNITY
End: 2024-05-01

## 2024-05-01 RX ORDER — METOPROLOL SUCCINATE 25 MG/1
25 TABLET, EXTENDED RELEASE ORAL
Qty: 180 | Refills: 3 | Status: ACTIVE | COMMUNITY
Start: 2021-01-19 | End: 1900-01-01

## 2024-05-13 ENCOUNTER — APPOINTMENT (OUTPATIENT)
Dept: ENDOCRINOLOGY | Facility: CLINIC | Age: 89
End: 2024-05-13
Payer: MEDICARE

## 2024-05-13 DIAGNOSIS — E11.59 TYPE 2 DIABETES MELLITUS WITH OTHER CIRCULATORY COMPLICATIONS: ICD-10-CM

## 2024-05-13 PROCEDURE — 95251 CONT GLUC MNTR ANALYSIS I&R: CPT

## 2024-05-13 RX ORDER — DAPAGLIFLOZIN 5 MG/1
5 TABLET, FILM COATED ORAL
Qty: 90 | Refills: 1 | Status: ACTIVE | COMMUNITY

## 2024-05-13 NOTE — HISTORY OF PRESENT ILLNESS
[FreeTextEntry1] : 89 yo WM coming for f/u DM2, seen Medinajosue Willson about a year ago , but still has some pancakes , ice-cream etc but much less \par         Follow up for TDM2 with micro and macrovascular complications including CABG 2013, HTN, HLD, Nephropathy and neuropathy. \par         Dx 10 years ago. \par         Hga1c down to 6.0 was up to 7.3, nthen  firher up to 7.6 last one dwn to 6 on 3/22 today A1c very good at 5.9 December 2022\par         Regimen: \par         Metformin ER  to 500mg PO Qam and 500mg 1tab  QPM no diarrhea\par         off Januvia 100mg PO at Lunch stopped 8/2018\par         Glipizide ER 5mg ER PO qam dose decreased last visit April 2022 and no more low sugar since then per patient\par  Farxiga 10mg qam added by Dr Cochran \par         Trulicity 1.5mg SC weekly denies side effects some chronic constipation he believes constipation started after Farxiga\par         Tolerating medications well with no SE. \par         Fell at home Fell on to coffee table. Fractured cervical spine. 3 months ago, lost weight then gained back\par         Checks BG levels tqam fasting only \par         Sporadically throughout the day\par       fasting , once in September 52, no symtoms \par    \par         Microvascular complications: \par         Nephropathy denies Cr 1.4 eGFR 42. Sees Dr. Cochran. decreased Clorthalidone twice a week, stopped Lisinopril with very good BP but lower EGFR \par         Neuropathy symptoms, yes\par         microfilament exam + neuropathy.\par         Retinopathy sees Dr. Fierro. Had Elyea Left no eye Injection has macular degeneration Visit Q6-8 weeks. per pt had some "beginning of retinopathy" will request the note for review  \par         No recent loss of vision or blurry vision. \par         Macrovascular complications: \par         HTN improved. Sees Dr. Chopra. Toprolol ER 25mg PO BID, Clorthalidone 25mg PO Twice weekly. Echo and stress next week. Diovan dcd and now on Losartan 100mg PO Qdaily\par         CAD/CVA yes CABG 2013/Afib. Off Elaquis since the fall on 81mg PO Qdaily\par         Lipids , LDL 58, HDL 28. Lovaza 2g bid, Lipitor 40mg qpm\par         Vitamin B12 liquid once weekly \par         Vitamin D3 1000iu tab Twice daily\par         Iron 325mg PO Qdaily\par         TSH 1.97.

## 2024-05-28 RX ORDER — APIXABAN 2.5 MG/1
2.5 TABLET, FILM COATED ORAL
Qty: 180 | Refills: 3 | Status: ACTIVE | COMMUNITY
Start: 2022-10-03 | End: 1900-01-01

## 2024-05-28 RX ORDER — NITROGLYCERIN 0.4 MG/1
0.4 TABLET SUBLINGUAL
Qty: 100 | Refills: 3 | Status: ACTIVE | COMMUNITY
Start: 2021-05-25 | End: 1900-01-01

## 2024-06-13 ENCOUNTER — RESULT REVIEW (OUTPATIENT)
Age: 89
End: 2024-06-13

## 2024-06-13 ENCOUNTER — APPOINTMENT (OUTPATIENT)
Dept: NEPHROLOGY | Facility: CLINIC | Age: 89
End: 2024-06-13
Payer: MEDICARE

## 2024-06-13 VITALS
DIASTOLIC BLOOD PRESSURE: 56 MMHG | BODY MASS INDEX: 26.37 KG/M2 | SYSTOLIC BLOOD PRESSURE: 136 MMHG | HEIGHT: 68 IN | HEART RATE: 59 BPM | OXYGEN SATURATION: 96 % | WEIGHT: 174 LBS

## 2024-06-13 DIAGNOSIS — N40.0 BENIGN PROSTATIC HYPERPLASIA WITHOUT LOWER URINARY TRACT SYMPMS: ICD-10-CM

## 2024-06-13 DIAGNOSIS — E87.5 HYPERKALEMIA: ICD-10-CM

## 2024-06-13 DIAGNOSIS — N18.30 CHRONIC KIDNEY DISEASE, STAGE 3 UNSPECIFIED: ICD-10-CM

## 2024-06-13 DIAGNOSIS — D63.1 CHRONIC KIDNEY DISEASE, STAGE 3 UNSPECIFIED: ICD-10-CM

## 2024-06-13 DIAGNOSIS — R60.0 LOCALIZED EDEMA: ICD-10-CM

## 2024-06-13 PROCEDURE — 99214 OFFICE O/P EST MOD 30 MIN: CPT

## 2024-06-13 RX ORDER — ISOSORBIDE DINITRATE 30 MG/1
30 TABLET ORAL EVERY 6 HOURS
Refills: 0 | Status: ACTIVE | COMMUNITY

## 2024-06-13 RX ORDER — LOPERAMIDE HYDROCHLORIDE 2 MG/1
2 CAPSULE ORAL
Refills: 0 | Status: DISCONTINUED | COMMUNITY
End: 2024-06-13

## 2024-06-13 RX ORDER — YOHIMBE BARK 500 MG
CAPSULE ORAL
Refills: 0 | Status: ACTIVE | COMMUNITY

## 2024-06-13 NOTE — ASSESSMENT
[FreeTextEntry1] : CKD 3  - cr 1.7-1.9 since 8/2022, linda from 1.4-1.6 in 9/2022 - has been on farxiga since 2021, held after hospitalization for CVA, has resumed in 4/2024,  - cont farxiga  PPI - check mag level  Anemia - does not appear to be 2/2 CKD< renal function too good, check iron panel, cbc, epo level  HTN  - cont MTP and amlodipine  Afib  - followed by Dr. Mariscal,  review whether to increase eliquis to 5bid as renal fx has improved, GFR 36  - will cc Cardiologist  Hyperlipidemia  - cont statin  - neurosurgery note reveiwed - cardiology note reviewed - endo note reviewed    Hgb a1c reviewed - 6.6 lipid reviewed BMET reviewed CBC reviewed    cc Endo and Cadiology.

## 2024-06-13 NOTE — HISTORY OF PRESENT ILLNESS
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

## 2024-06-20 ENCOUNTER — APPOINTMENT (OUTPATIENT)
Dept: NEPHROLOGY | Facility: CLINIC | Age: 89
End: 2024-06-20
Payer: MEDICARE

## 2024-06-20 ENCOUNTER — RESULT REVIEW (OUTPATIENT)
Age: 89
End: 2024-06-20

## 2024-06-20 DIAGNOSIS — E11.22 TYPE 2 DIABETES MELLITUS WITH DIABETIC CHRONIC KIDNEY DISEASE: ICD-10-CM

## 2024-06-20 DIAGNOSIS — N18.30 TYPE 2 DIABETES MELLITUS WITH DIABETIC CHRONIC KIDNEY DISEASE: ICD-10-CM

## 2024-06-20 PROCEDURE — 36415 COLL VENOUS BLD VENIPUNCTURE: CPT

## 2024-06-21 ENCOUNTER — NON-APPOINTMENT (OUTPATIENT)
Age: 89
End: 2024-06-21

## 2024-06-21 ENCOUNTER — APPOINTMENT (OUTPATIENT)
Dept: HEART AND VASCULAR | Facility: CLINIC | Age: 89
End: 2024-06-21
Payer: MEDICARE

## 2024-06-21 VITALS
HEART RATE: 57 BPM | DIASTOLIC BLOOD PRESSURE: 62 MMHG | SYSTOLIC BLOOD PRESSURE: 104 MMHG | HEIGHT: 68 IN | OXYGEN SATURATION: 99 % | BODY MASS INDEX: 26.71 KG/M2 | WEIGHT: 176.25 LBS

## 2024-06-21 DIAGNOSIS — I10 ESSENTIAL (PRIMARY) HYPERTENSION: ICD-10-CM

## 2024-06-21 DIAGNOSIS — I25.10 ATHEROSCLEROTIC HEART DISEASE OF NATIVE CORONARY ARTERY W/OUT ANGINA PECTORIS: ICD-10-CM

## 2024-06-21 DIAGNOSIS — I48.0 PAROXYSMAL ATRIAL FIBRILLATION: ICD-10-CM

## 2024-06-21 DIAGNOSIS — E78.2 MIXED HYPERLIPIDEMIA: ICD-10-CM

## 2024-06-21 DIAGNOSIS — R00.1 BRADYCARDIA, UNSPECIFIED: ICD-10-CM

## 2024-06-21 DIAGNOSIS — Z95.1 PRESENCE OF AORTOCORONARY BYPASS GRAFT: ICD-10-CM

## 2024-06-21 PROCEDURE — 93000 ELECTROCARDIOGRAM COMPLETE: CPT

## 2024-06-21 PROCEDURE — 99214 OFFICE O/P EST MOD 30 MIN: CPT

## 2024-06-21 RX ORDER — ISOSORBIDE MONONITRATE 30 MG/1
30 TABLET, EXTENDED RELEASE ORAL
Qty: 180 | Refills: 3 | Status: ACTIVE | COMMUNITY
Start: 2021-07-06 | End: 1900-01-01

## 2024-06-21 RX ORDER — AMLODIPINE BESYLATE 2.5 MG/1
2.5 TABLET ORAL DAILY
Qty: 90 | Refills: 3 | Status: ACTIVE | COMMUNITY
Start: 1900-01-01 | End: 1900-01-01

## 2024-06-21 RX ORDER — RANOLAZINE 500 MG/1
500 TABLET, EXTENDED RELEASE ORAL
Qty: 180 | Refills: 3 | Status: ACTIVE | COMMUNITY
Start: 1900-01-01 | End: 1900-01-01

## 2024-06-21 NOTE — HISTORY OF PRESENT ILLNESS
[FreeTextEntry1] : 89 y/o M patient of Dr. Sprague.  Here to transition care.  Hx of afib eliquis 2.5mg BID, CAD s/p CABG 2014 at [facility] (no further interventions) with recent intracranial bleeding s/p fall Nov 2023.   Recent fall at home late November 2023 (unwitnessed) and found to have cervical neck fracture and intracranial bleeding which was managed conservatively.  ECHO unremarkable.  Hospitalized at [facility] 11/18-11/29/23.  Restarted eliquis in early December by neurology.   he has no recollection of the fall and noone was there to witness it.  He has fallen previously 2-3 times in the last year.    Had a major fall 5 years ago where he broke a cervical vertebrae.  Patient feels like he is getting better, more clear headed, can read the newspaper, walking with aid of walker, participating in physical therapy at rehab center.    His CAD is stable, prior to fall was exercising, used rollator or cane.  occasionally would take SL nitro with relief of angina.  He has no complaints of chest pain or sob currently.  No palpitations.  No leg edema.    3/21/24:  was admitted to [facility] 3 weeks ago with chest pain, mildly elevated troponins.  ECHO wnl.  Cor CTA with patent LIMA and art grafts to D1 and OM2, occluded SVG to RCA.  He is feeling well, out of rehab, no chest pain.  Has not used SL nitro  6/21/24:  doing well, no falls, no bleeding.  had episode of chest pain, restarted isosorbide daily and it has not recurred.  occasionally feels brief heart racing, self resolves, no dizziness/sob/syncope.

## 2024-06-26 ENCOUNTER — LABORATORY RESULT (OUTPATIENT)
Age: 89
End: 2024-06-26

## 2024-07-11 ENCOUNTER — APPOINTMENT (OUTPATIENT)
Dept: GERIATRICS | Facility: CLINIC | Age: 89
End: 2024-07-11
Payer: MEDICARE

## 2024-07-11 VITALS
OXYGEN SATURATION: 99 % | TEMPERATURE: 97.6 F | WEIGHT: 174 LBS | DIASTOLIC BLOOD PRESSURE: 64 MMHG | HEART RATE: 60 BPM | HEIGHT: 68 IN | SYSTOLIC BLOOD PRESSURE: 122 MMHG | BODY MASS INDEX: 26.37 KG/M2

## 2024-07-11 DIAGNOSIS — M15.9 POLYOSTEOARTHRITIS, UNSPECIFIED: ICD-10-CM

## 2024-07-11 DIAGNOSIS — D72.829 ELEVATED WHITE BLOOD CELL COUNT, UNSPECIFIED: ICD-10-CM

## 2024-07-11 DIAGNOSIS — I25.10 ATHEROSCLEROTIC HEART DISEASE OF NATIVE CORONARY ARTERY W/OUT ANGINA PECTORIS: ICD-10-CM

## 2024-07-11 DIAGNOSIS — I25.810 ATHEROSCLEROSIS OF CORONARY ARTERY BYPASS GRAFT(S) W/OUT ANGINA PECTORIS: ICD-10-CM

## 2024-07-11 DIAGNOSIS — E11.22 TYPE 2 DIABETES MELLITUS WITH DIABETIC CHRONIC KIDNEY DISEASE: ICD-10-CM

## 2024-07-11 DIAGNOSIS — R26.81 UNSTEADINESS ON FEET: ICD-10-CM

## 2024-07-11 DIAGNOSIS — I48.0 PAROXYSMAL ATRIAL FIBRILLATION: ICD-10-CM

## 2024-07-11 DIAGNOSIS — E11.59 TYPE 2 DIABETES MELLITUS WITH OTHER CIRCULATORY COMPLICATIONS: ICD-10-CM

## 2024-07-11 DIAGNOSIS — N18.30 TYPE 2 DIABETES MELLITUS WITH DIABETIC CHRONIC KIDNEY DISEASE: ICD-10-CM

## 2024-07-11 DIAGNOSIS — N40.0 BENIGN PROSTATIC HYPERPLASIA WITHOUT LOWER URINARY TRACT SYMPMS: ICD-10-CM

## 2024-07-11 DIAGNOSIS — E78.5 HYPERLIPIDEMIA, UNSPECIFIED: ICD-10-CM

## 2024-07-11 DIAGNOSIS — K21.9 GASTRO-ESOPHAGEAL REFLUX DISEASE W/OUT ESOPHAGITIS: ICD-10-CM

## 2024-07-11 PROCEDURE — G0439: CPT

## 2024-07-29 ENCOUNTER — APPOINTMENT (OUTPATIENT)
Dept: ENDOCRINOLOGY | Facility: CLINIC | Age: 89
End: 2024-07-29

## 2024-07-31 ENCOUNTER — APPOINTMENT (OUTPATIENT)
Dept: ENDOCRINOLOGY | Facility: CLINIC | Age: 89
End: 2024-07-31
Payer: MEDICARE

## 2024-07-31 VITALS
BODY MASS INDEX: 26.98 KG/M2 | DIASTOLIC BLOOD PRESSURE: 62 MMHG | WEIGHT: 178 LBS | HEART RATE: 55 BPM | HEIGHT: 68 IN | SYSTOLIC BLOOD PRESSURE: 120 MMHG | OXYGEN SATURATION: 97 %

## 2024-07-31 DIAGNOSIS — N18.30 TYPE 2 DIABETES MELLITUS WITH DIABETIC CHRONIC KIDNEY DISEASE: ICD-10-CM

## 2024-07-31 DIAGNOSIS — E11.22 TYPE 2 DIABETES MELLITUS WITH DIABETIC CHRONIC KIDNEY DISEASE: ICD-10-CM

## 2024-07-31 DIAGNOSIS — E11.59 TYPE 2 DIABETES MELLITUS WITH OTHER CIRCULATORY COMPLICATIONS: ICD-10-CM

## 2024-07-31 DIAGNOSIS — E01.0 IODINE-DEFICIENCY RELATED DIFFUSE (ENDEMIC) GOITER: ICD-10-CM

## 2024-07-31 DIAGNOSIS — E78.5 HYPERLIPIDEMIA, UNSPECIFIED: ICD-10-CM

## 2024-07-31 DIAGNOSIS — E11.3293 TYPE 2 DIABETES MELLITUS WITH MILD NONPROLIFERATIVE DIABETIC RETINOPATHY WITHOUT MACULAR EDEMA, BILATERAL: ICD-10-CM

## 2024-07-31 DIAGNOSIS — E53.8 DEFICIENCY OF OTHER SPECIFIED B GROUP VITAMINS: ICD-10-CM

## 2024-07-31 DIAGNOSIS — E55.9 VITAMIN D DEFICIENCY, UNSPECIFIED: ICD-10-CM

## 2024-07-31 DIAGNOSIS — E78.2 MIXED HYPERLIPIDEMIA: ICD-10-CM

## 2024-07-31 LAB — GLUCOSE BLDC GLUCOMTR-MCNC: 118

## 2024-07-31 PROCEDURE — 99215 OFFICE O/P EST HI 40 MIN: CPT

## 2024-07-31 PROCEDURE — G2211 COMPLEX E/M VISIT ADD ON: CPT

## 2024-07-31 RX ORDER — CHOLECALCIFEROL (VITAMIN D3) 25 MCG
TABLET,CHEWABLE ORAL
Refills: 0 | Status: ACTIVE | COMMUNITY

## 2024-07-31 RX ORDER — DULAGLUTIDE 0.75 MG/.5ML
0.75 INJECTION, SOLUTION SUBCUTANEOUS
Qty: 1 | Refills: 0 | Status: ACTIVE | COMMUNITY
Start: 2024-07-31 | End: 1900-01-01

## 2024-07-31 NOTE — HISTORY OF PRESENT ILLNESS
[FreeTextEntry1] : 89 yo WM coming for f/u DM2, seen Medina Willson about a year ago , but still has some pancakes , ice-cream etc but much less         Follow up for TDM2 with micro and macrovascular complications including CABG , HTN, HLD, Nephropathy and neuropathy.         Dx 10 years ago.         Hga1c down to 6.0 was up to 7.3, nthen  firher up to 7.6 last one dwn to 6 on 3/22 today A1c very good at 5.2022        Regimen:         Metformin ER  to 500mg PO Qam and 500mg 1tab  QPM no diarrhea        off Januvia 100mg PO at Lunch stopped 2018        Glipizide ER 5mg ER PO qam dose decreased last visit 2022 and no more low sugar since then per patient Farxiga 10mg qam added by Dr Sammie Rose 1.5mg SC weekly denies side effects some chronic constipation he believes constipation started after Farxiga        Tolerating medications well with no SE.         Fell at home Fell on to coffee table. Fractured cervical spine. 3 months ago, lost weight then gained back        Checks BG levels tqam fasting only         Sporadically throughout the day      fasting , once in , no symtoms            Microvascular complications:         Nephropathy denies Cr 1.4 eGFR 42. Sees Dr. Cochran. decreased Clorthalidone twice a week, stopped Lisinopril with very good BP but lower EGFR         Neuropathy symptoms, yes        microfilament exam + neuropathy.        Retinopathy sees Dr. Fierro. Had E[place]ea Left no eye Injection has macular degeneration Visit Q6-8 weeks. per pt had some "beginning of retinopathy" will request the note for review          No recent loss of vision or blurry vision.         Macrovascular complications:         HTN improved. Sees Dr. Chopra. Toprolol ER 25mg PO BID, Clorthalidone 25mg PO Twice weekly. Echo and stress next week. Diovan dcd and now on Losartan 100mg PO Qdaily        CAD/CVA yes CABG /Afib. Off Elaquis since the fall on 81mg PO Qdaily        Lipids , LDL 58, HDL 28. Lovaza 2g bid, Lipitor 40mg qpm        Vitamin B12 liquid once weekly         Vitamin D3 1000iu tab Twice daily        Iron 325mg PO Qd        TSH 1.97.  24 Had a fall in his room at [place] November last year cannot remember how it happened ended up with a bleeding in his head was at the rehab for 4 months where he did not receive the Trulicity A1c went up also sugars fasting in the 130s 140s per patient checked once a week His partner  2023 lives alone in the room

## 2024-07-31 NOTE — PHYSICAL EXAM
[Alert] : alert [Well Nourished] : well nourished [No Acute Distress] : no acute distress [Well Developed] : well developed [Normal Sclera/Conjunctiva] : normal sclera/conjunctiva [EOMI] : extra ocular movement intact [No Proptosis] : no proptosis [Normal Oropharynx] : the oropharynx was normal [Thyroid Not Enlarged] : the thyroid was not enlarged [No Thyroid Nodules] : no palpable thyroid nodules [No Respiratory Distress] : no respiratory distress [No Accessory Muscle Use] : no accessory muscle use [Clear to Auscultation] : lungs were clear to auscultation bilaterally [Normal S1, S2] : normal S1 and S2 [Normal Rate] : heart rate was normal [Regular Rhythm] : with a regular rhythm [No Edema] : no peripheral edema [Pedal Pulses Normal] : the pedal pulses are present [Normal Bowel Sounds] : normal bowel sounds [Not Tender] : non-tender [Not Distended] : not distended [Soft] : abdomen soft [Normal Anterior Cervical Nodes] : no anterior cervical lymphadenopathy [Normal Posterior Cervical Nodes] : no posterior cervical lymphadenopathy [No Spinal Tenderness] : no spinal tenderness [Spine Straight] : spine straight [No Stigmata of Cushings Syndrome] : no stigmata of Cushings Syndrome [Normal Gait] : normal gait [Normal Strength/Tone] : muscle strength and tone were normal [No Rash] : no rash [Normal Reflexes] : deep tendon reflexes were 2+ and symmetric [No Tremors] : no tremors [Oriented x3] : oriented to person, place, and time [Acanthosis Nigricans] : no acanthosis nigricans [de-identified] : using a walker  [de-identified] : puffy eyes bilateral chronic per pt seen Ophthalmology regularly

## 2024-08-04 NOTE — ASSESSMENT
[FreeTextEntry1] : off of losartan\par K+ stable but has not normalized\par repeat labs BMP and PSA next week\par \par gluteal spasm\par advise heating pad TID and PT\par if no improvement - ideally not a good candidate for flexeril due to fx RBB/CAD\par but can discuss with cardiology of benefits outweight risks of low dose trial\par \par MOLST updated - \par \par next year consider shingrix\par \par \par \par  complains of pain/discomfort

## 2024-08-28 ENCOUNTER — LABORATORY RESULT (OUTPATIENT)
Age: 89
End: 2024-08-28

## 2024-10-07 DIAGNOSIS — E11.59 TYPE 2 DIABETES MELLITUS WITH OTHER CIRCULATORY COMPLICATIONS: ICD-10-CM

## 2024-10-07 RX ORDER — DULAGLUTIDE 1.5 MG/.5ML
1.5 INJECTION, SOLUTION SUBCUTANEOUS
Qty: 3 | Refills: 3 | Status: ACTIVE | COMMUNITY
Start: 2024-10-07 | End: 1900-01-01

## 2024-10-10 ENCOUNTER — RESULT REVIEW (OUTPATIENT)
Age: 89
End: 2024-10-10

## 2024-10-10 ENCOUNTER — APPOINTMENT (OUTPATIENT)
Dept: NEPHROLOGY | Facility: CLINIC | Age: 89
End: 2024-10-10
Payer: MEDICARE

## 2024-10-10 VITALS
HEIGHT: 68 IN | OXYGEN SATURATION: 98 % | HEART RATE: 76 BPM | SYSTOLIC BLOOD PRESSURE: 100 MMHG | DIASTOLIC BLOOD PRESSURE: 60 MMHG | WEIGHT: 175 LBS | BODY MASS INDEX: 26.52 KG/M2

## 2024-10-10 DIAGNOSIS — E11.22 TYPE 2 DIABETES MELLITUS WITH DIABETIC CHRONIC KIDNEY DISEASE: ICD-10-CM

## 2024-10-10 DIAGNOSIS — I10 ESSENTIAL (PRIMARY) HYPERTENSION: ICD-10-CM

## 2024-10-10 DIAGNOSIS — N18.30 TYPE 2 DIABETES MELLITUS WITH DIABETIC CHRONIC KIDNEY DISEASE: ICD-10-CM

## 2024-10-10 DIAGNOSIS — I48.0 PAROXYSMAL ATRIAL FIBRILLATION: ICD-10-CM

## 2024-10-10 PROCEDURE — 99214 OFFICE O/P EST MOD 30 MIN: CPT

## 2024-10-10 PROCEDURE — G2211 COMPLEX E/M VISIT ADD ON: CPT

## 2024-11-13 ENCOUNTER — APPOINTMENT (OUTPATIENT)
Dept: GERIATRICS | Facility: CLINIC | Age: 89
End: 2024-11-13
Payer: MEDICARE

## 2024-11-13 VITALS
HEIGHT: 68 IN | WEIGHT: 177 LBS | TEMPERATURE: 97.4 F | BODY MASS INDEX: 26.83 KG/M2 | HEART RATE: 65 BPM | SYSTOLIC BLOOD PRESSURE: 130 MMHG | OXYGEN SATURATION: 99 % | DIASTOLIC BLOOD PRESSURE: 64 MMHG

## 2024-11-13 DIAGNOSIS — E78.2 MIXED HYPERLIPIDEMIA: ICD-10-CM

## 2024-11-13 DIAGNOSIS — I48.0 PAROXYSMAL ATRIAL FIBRILLATION: ICD-10-CM

## 2024-11-13 DIAGNOSIS — I10 ESSENTIAL (PRIMARY) HYPERTENSION: ICD-10-CM

## 2024-11-13 DIAGNOSIS — E11.59 TYPE 2 DIABETES MELLITUS WITH OTHER CIRCULATORY COMPLICATIONS: ICD-10-CM

## 2024-11-13 DIAGNOSIS — S12.600A UNSPECIFIED DISPLACED FRACTURE OF SEVENTH CERVICAL VERTEBRA, INITIAL ENCOUNTER FOR CLOSED FRACTURE: ICD-10-CM

## 2024-11-13 DIAGNOSIS — I49.8 OTHER SPECIFIED CARDIAC ARRHYTHMIAS: ICD-10-CM

## 2024-11-13 PROCEDURE — 99214 OFFICE O/P EST MOD 30 MIN: CPT

## 2024-11-13 PROCEDURE — G2211 COMPLEX E/M VISIT ADD ON: CPT

## 2024-11-19 ENCOUNTER — RX RENEWAL (OUTPATIENT)
Age: 89
End: 2024-11-19

## 2024-12-17 ENCOUNTER — NON-APPOINTMENT (OUTPATIENT)
Age: 88
End: 2024-12-17

## 2024-12-17 ENCOUNTER — APPOINTMENT (OUTPATIENT)
Dept: HEART AND VASCULAR | Facility: CLINIC | Age: 88
End: 2024-12-17
Payer: MEDICARE

## 2024-12-17 VITALS
HEART RATE: 57 BPM | DIASTOLIC BLOOD PRESSURE: 68 MMHG | HEIGHT: 68 IN | WEIGHT: 174 LBS | SYSTOLIC BLOOD PRESSURE: 138 MMHG | BODY MASS INDEX: 26.37 KG/M2 | OXYGEN SATURATION: 99 %

## 2024-12-17 DIAGNOSIS — Z79.01 LONG TERM (CURRENT) USE OF ANTICOAGULANTS: ICD-10-CM

## 2024-12-17 DIAGNOSIS — E78.5 HYPERLIPIDEMIA, UNSPECIFIED: ICD-10-CM

## 2024-12-17 DIAGNOSIS — I48.0 PAROXYSMAL ATRIAL FIBRILLATION: ICD-10-CM

## 2024-12-17 DIAGNOSIS — Z95.1 PRESENCE OF AORTOCORONARY BYPASS GRAFT: ICD-10-CM

## 2024-12-17 DIAGNOSIS — I25.10 ATHEROSCLEROTIC HEART DISEASE OF NATIVE CORONARY ARTERY W/OUT ANGINA PECTORIS: ICD-10-CM

## 2024-12-17 DIAGNOSIS — I10 ESSENTIAL (PRIMARY) HYPERTENSION: ICD-10-CM

## 2024-12-17 PROCEDURE — 93000 ELECTROCARDIOGRAM COMPLETE: CPT

## 2024-12-17 PROCEDURE — G2211 COMPLEX E/M VISIT ADD ON: CPT

## 2024-12-17 PROCEDURE — 99214 OFFICE O/P EST MOD 30 MIN: CPT

## 2024-12-25 PROBLEM — F10.90 ALCOHOL USE: Status: ACTIVE | Noted: 2018-11-17

## 2025-01-09 ENCOUNTER — RX RENEWAL (OUTPATIENT)
Age: 89
End: 2025-01-09

## 2025-01-23 ENCOUNTER — RX RENEWAL (OUTPATIENT)
Age: 89
End: 2025-01-23

## 2025-01-24 LAB — HBA1C MFR BLD HPLC: 6.4

## 2025-01-30 ENCOUNTER — NON-APPOINTMENT (OUTPATIENT)
Age: 89
End: 2025-01-30

## 2025-01-30 ENCOUNTER — APPOINTMENT (OUTPATIENT)
Dept: ENDOCRINOLOGY | Facility: CLINIC | Age: 89
End: 2025-01-30
Payer: MEDICARE

## 2025-01-30 VITALS
WEIGHT: 170 LBS | SYSTOLIC BLOOD PRESSURE: 142 MMHG | HEIGHT: 68 IN | DIASTOLIC BLOOD PRESSURE: 70 MMHG | BODY MASS INDEX: 25.76 KG/M2 | HEART RATE: 66 BPM | OXYGEN SATURATION: 99 %

## 2025-01-30 DIAGNOSIS — E01.0 IODINE-DEFICIENCY RELATED DIFFUSE (ENDEMIC) GOITER: ICD-10-CM

## 2025-01-30 DIAGNOSIS — N18.30 TYPE 2 DIABETES MELLITUS WITH DIABETIC CHRONIC KIDNEY DISEASE: ICD-10-CM

## 2025-01-30 DIAGNOSIS — E11.22 TYPE 2 DIABETES MELLITUS WITH DIABETIC CHRONIC KIDNEY DISEASE: ICD-10-CM

## 2025-01-30 DIAGNOSIS — Z13.820 ENCOUNTER FOR SCREENING FOR OSTEOPOROSIS: ICD-10-CM

## 2025-01-30 DIAGNOSIS — E11.59 TYPE 2 DIABETES MELLITUS WITH OTHER CIRCULATORY COMPLICATIONS: ICD-10-CM

## 2025-01-30 DIAGNOSIS — E11.3293 TYPE 2 DIABETES MELLITUS WITH MILD NONPROLIFERATIVE DIABETIC RETINOPATHY WITHOUT MACULAR EDEMA, BILATERAL: ICD-10-CM

## 2025-01-30 DIAGNOSIS — E78.5 HYPERLIPIDEMIA, UNSPECIFIED: ICD-10-CM

## 2025-01-30 DIAGNOSIS — E55.9 VITAMIN D DEFICIENCY, UNSPECIFIED: ICD-10-CM

## 2025-01-30 DIAGNOSIS — E53.8 DEFICIENCY OF OTHER SPECIFIED B GROUP VITAMINS: ICD-10-CM

## 2025-01-30 LAB — GLUCOSE BLDC GLUCOMTR-MCNC: 122

## 2025-01-30 PROCEDURE — G2211 COMPLEX E/M VISIT ADD ON: CPT

## 2025-01-30 PROCEDURE — 99215 OFFICE O/P EST HI 40 MIN: CPT

## 2025-02-18 ENCOUNTER — RX RENEWAL (OUTPATIENT)
Age: 89
End: 2025-02-18

## 2025-02-26 ENCOUNTER — RESULT REVIEW (OUTPATIENT)
Age: 89
End: 2025-02-26

## 2025-03-10 ENCOUNTER — APPOINTMENT (OUTPATIENT)
Dept: GERIATRICS | Facility: CLINIC | Age: 89
End: 2025-03-10
Payer: MEDICARE

## 2025-03-10 VITALS
OXYGEN SATURATION: 99 % | SYSTOLIC BLOOD PRESSURE: 136 MMHG | BODY MASS INDEX: 26.67 KG/M2 | DIASTOLIC BLOOD PRESSURE: 72 MMHG | WEIGHT: 176 LBS | TEMPERATURE: 97.2 F | HEART RATE: 73 BPM | HEIGHT: 68 IN

## 2025-03-10 DIAGNOSIS — I48.0 PAROXYSMAL ATRIAL FIBRILLATION: ICD-10-CM

## 2025-03-10 DIAGNOSIS — E78.5 HYPERLIPIDEMIA, UNSPECIFIED: ICD-10-CM

## 2025-03-10 PROCEDURE — G2211 COMPLEX E/M VISIT ADD ON: CPT

## 2025-03-10 PROCEDURE — 99214 OFFICE O/P EST MOD 30 MIN: CPT

## 2025-03-20 ENCOUNTER — RESULT REVIEW (OUTPATIENT)
Age: 89
End: 2025-03-20

## 2025-03-20 ENCOUNTER — APPOINTMENT (OUTPATIENT)
Dept: NEPHROLOGY | Facility: CLINIC | Age: 89
End: 2025-03-20
Payer: MEDICARE

## 2025-03-20 VITALS
HEIGHT: 68 IN | HEART RATE: 57 BPM | WEIGHT: 174 LBS | BODY MASS INDEX: 26.37 KG/M2 | OXYGEN SATURATION: 98 % | SYSTOLIC BLOOD PRESSURE: 110 MMHG | DIASTOLIC BLOOD PRESSURE: 80 MMHG

## 2025-03-20 DIAGNOSIS — I10 ESSENTIAL (PRIMARY) HYPERTENSION: ICD-10-CM

## 2025-03-20 DIAGNOSIS — E11.22 TYPE 2 DIABETES MELLITUS WITH DIABETIC CHRONIC KIDNEY DISEASE: ICD-10-CM

## 2025-03-20 DIAGNOSIS — E87.5 HYPERKALEMIA: ICD-10-CM

## 2025-03-20 DIAGNOSIS — N18.30 TYPE 2 DIABETES MELLITUS WITH DIABETIC CHRONIC KIDNEY DISEASE: ICD-10-CM

## 2025-03-20 PROCEDURE — 99214 OFFICE O/P EST MOD 30 MIN: CPT

## 2025-03-20 PROCEDURE — G2211 COMPLEX E/M VISIT ADD ON: CPT

## 2025-04-08 ENCOUNTER — RX RENEWAL (OUTPATIENT)
Age: 89
End: 2025-04-08

## 2025-04-24 ENCOUNTER — RX RENEWAL (OUTPATIENT)
Age: 89
End: 2025-04-24

## 2025-06-20 ENCOUNTER — APPOINTMENT (OUTPATIENT)
Dept: HEART AND VASCULAR | Facility: CLINIC | Age: 89
End: 2025-06-20
Payer: MEDICARE

## 2025-06-20 VITALS
OXYGEN SATURATION: 99 % | WEIGHT: 170 LBS | BODY MASS INDEX: 25.85 KG/M2 | HEART RATE: 57 BPM | SYSTOLIC BLOOD PRESSURE: 120 MMHG | DIASTOLIC BLOOD PRESSURE: 80 MMHG

## 2025-06-20 PROCEDURE — G2211 COMPLEX E/M VISIT ADD ON: CPT

## 2025-06-20 PROCEDURE — 99214 OFFICE O/P EST MOD 30 MIN: CPT

## 2025-06-20 PROCEDURE — 93000 ELECTROCARDIOGRAM COMPLETE: CPT

## 2025-07-21 NOTE — HISTORY OF PRESENT ILLNESS
[FreeTextEntry1] : Feeling well\par now no longer on chlorthalidone and on amlodipine \par feeling somewhat more constipated but now on colace \par more concerns about partner who is now 89, advancing age and feeling depressed  [No falls in past year] : Patient reported no falls in the past year [Completely Independent] : Completely independent. [0] : 2) Feeling down, depressed, or hopeless: Not at all (0) [PHQ-2 Negative - No further assessment needed] : PHQ-2 Negative - No further assessment needed [[id number]] : 0 oral

## 2025-07-31 ENCOUNTER — APPOINTMENT (OUTPATIENT)
Dept: ENDOCRINOLOGY | Facility: CLINIC | Age: 89
End: 2025-07-31
Payer: MEDICARE

## 2025-07-31 VITALS
SYSTOLIC BLOOD PRESSURE: 142 MMHG | OXYGEN SATURATION: 100 % | WEIGHT: 170 LBS | BODY MASS INDEX: 25.85 KG/M2 | DIASTOLIC BLOOD PRESSURE: 64 MMHG | HEART RATE: 75 BPM

## 2025-07-31 DIAGNOSIS — N18.30 TYPE 2 DIABETES MELLITUS WITH DIABETIC CHRONIC KIDNEY DISEASE: ICD-10-CM

## 2025-07-31 DIAGNOSIS — E11.22 TYPE 2 DIABETES MELLITUS WITH DIABETIC CHRONIC KIDNEY DISEASE: ICD-10-CM

## 2025-07-31 DIAGNOSIS — E55.9 VITAMIN D DEFICIENCY, UNSPECIFIED: ICD-10-CM

## 2025-07-31 DIAGNOSIS — E11.59 TYPE 2 DIABETES MELLITUS WITH OTHER CIRCULATORY COMPLICATIONS: ICD-10-CM

## 2025-07-31 DIAGNOSIS — E01.0 IODINE-DEFICIENCY RELATED DIFFUSE (ENDEMIC) GOITER: ICD-10-CM

## 2025-07-31 DIAGNOSIS — E53.8 DEFICIENCY OF OTHER SPECIFIED B GROUP VITAMINS: ICD-10-CM

## 2025-07-31 DIAGNOSIS — Z13.820 ENCOUNTER FOR SCREENING FOR OSTEOPOROSIS: ICD-10-CM

## 2025-07-31 DIAGNOSIS — E78.5 HYPERLIPIDEMIA, UNSPECIFIED: ICD-10-CM

## 2025-07-31 DIAGNOSIS — E11.3293 TYPE 2 DIABETES MELLITUS WITH MILD NONPROLIFERATIVE DIABETIC RETINOPATHY WITHOUT MACULAR EDEMA, BILATERAL: ICD-10-CM

## 2025-07-31 LAB — GLUCOSE BLDC GLUCOMTR-MCNC: 179

## 2025-07-31 PROCEDURE — G2211 COMPLEX E/M VISIT ADD ON: CPT

## 2025-07-31 PROCEDURE — 99215 OFFICE O/P EST HI 40 MIN: CPT

## 2025-09-17 ENCOUNTER — APPOINTMENT (OUTPATIENT)
Dept: NEPHROLOGY | Facility: CLINIC | Age: 89
End: 2025-09-17
Payer: MEDICARE

## 2025-09-17 VITALS
OXYGEN SATURATION: 99 % | WEIGHT: 172 LBS | HEIGHT: 68 IN | HEART RATE: 77 BPM | BODY MASS INDEX: 26.07 KG/M2 | SYSTOLIC BLOOD PRESSURE: 122 MMHG | DIASTOLIC BLOOD PRESSURE: 60 MMHG

## 2025-09-17 DIAGNOSIS — I10 ESSENTIAL (PRIMARY) HYPERTENSION: ICD-10-CM

## 2025-09-17 DIAGNOSIS — E11.22 TYPE 2 DIABETES MELLITUS WITH DIABETIC CHRONIC KIDNEY DISEASE: ICD-10-CM

## 2025-09-17 DIAGNOSIS — R60.0 LOCALIZED EDEMA: ICD-10-CM

## 2025-09-17 DIAGNOSIS — E78.5 HYPERLIPIDEMIA, UNSPECIFIED: ICD-10-CM

## 2025-09-17 DIAGNOSIS — I48.0 PAROXYSMAL ATRIAL FIBRILLATION: ICD-10-CM

## 2025-09-17 DIAGNOSIS — N18.30 TYPE 2 DIABETES MELLITUS WITH DIABETIC CHRONIC KIDNEY DISEASE: ICD-10-CM

## 2025-09-17 PROCEDURE — 99214 OFFICE O/P EST MOD 30 MIN: CPT
